# Patient Record
Sex: FEMALE | Race: WHITE | NOT HISPANIC OR LATINO | Employment: OTHER | URBAN - METROPOLITAN AREA
[De-identification: names, ages, dates, MRNs, and addresses within clinical notes are randomized per-mention and may not be internally consistent; named-entity substitution may affect disease eponyms.]

---

## 2017-02-24 ENCOUNTER — HISTORICAL (OUTPATIENT)
Dept: RADIOLOGY | Facility: HOSPITAL | Age: 72
End: 2017-02-24

## 2017-03-10 ENCOUNTER — HISTORICAL (OUTPATIENT)
Dept: ADMINISTRATIVE | Facility: HOSPITAL | Age: 72
End: 2017-03-10

## 2017-04-13 ENCOUNTER — HISTORICAL (OUTPATIENT)
Dept: ADMINISTRATIVE | Facility: HOSPITAL | Age: 72
End: 2017-04-13

## 2017-07-13 ENCOUNTER — HISTORICAL (OUTPATIENT)
Dept: ADMINISTRATIVE | Facility: HOSPITAL | Age: 72
End: 2017-07-13

## 2017-09-13 ENCOUNTER — HISTORICAL (OUTPATIENT)
Dept: ADMINISTRATIVE | Facility: HOSPITAL | Age: 72
End: 2017-09-13

## 2017-09-13 LAB
ABS NEUT (OLG): 2.14 X10(3)/MCL (ref 2.1–9.2)
BASOPHILS # BLD AUTO: 0 X10(3)/MCL (ref 0–0.2)
BASOPHILS NFR BLD AUTO: 1 %
CHOLEST SERPL-MCNC: 186 MG/DL (ref 0–200)
CHOLEST/HDLC SERPL: 2.8 {RATIO} (ref 0–4)
DEPRECATED CALCIDIOL+CALCIFEROL SERPL-MC: 46.39 NG/ML (ref 30–80)
EOSINOPHIL # BLD AUTO: 0.1 X10(3)/MCL (ref 0–0.9)
EOSINOPHIL NFR BLD AUTO: 3 %
ERYTHROCYTE [DISTWIDTH] IN BLOOD BY AUTOMATED COUNT: 14.1 % (ref 11.5–17)
HCT VFR BLD AUTO: 36.9 % (ref 37–47)
HDLC SERPL-MCNC: 67 MG/DL (ref 35–60)
HGB BLD-MCNC: 11.8 GM/DL (ref 12–16)
IRON SATN MFR SERPL: 29.9 % (ref 20–50)
IRON SERPL-MCNC: 91 MCG/DL (ref 50–175)
LDLC SERPL CALC-MCNC: 105 MG/DL (ref 0–129)
LYMPHOCYTES # BLD AUTO: 1.5 X10(3)/MCL (ref 0.6–4.6)
LYMPHOCYTES NFR BLD AUTO: 36 %
MCH RBC QN AUTO: 29 PG (ref 27–31)
MCHC RBC AUTO-ENTMCNC: 32 GM/DL (ref 33–36)
MCV RBC AUTO: 90.7 FL (ref 80–94)
MONOCYTES # BLD AUTO: 0.5 X10(3)/MCL (ref 0.1–1.3)
MONOCYTES NFR BLD AUTO: 12 %
NEUTROPHILS # BLD AUTO: 2.14 X10(3)/MCL (ref 2.1–9.2)
NEUTROPHILS NFR BLD AUTO: 49 %
PLATELET # BLD AUTO: 211 X10(3)/MCL (ref 130–400)
PMV BLD AUTO: 9.4 FL (ref 9.4–12.4)
RBC # BLD AUTO: 4.07 X10(6)/MCL (ref 4.2–5.4)
TIBC SERPL-MCNC: 304 MCG/DL (ref 250–450)
TRANSFERRIN SERPL-MCNC: 234 MG/DL (ref 200–360)
TRIGL SERPL-MCNC: 68 MG/DL (ref 30–150)
VLDLC SERPL CALC-MCNC: 14 MG/DL
WBC # SPEC AUTO: 4.3 X10(3)/MCL (ref 4.5–11.5)

## 2017-09-22 ENCOUNTER — HISTORICAL (OUTPATIENT)
Dept: RADIOLOGY | Facility: HOSPITAL | Age: 72
End: 2017-09-22

## 2017-09-22 LAB — POC CREATININE: 0.8 MG/DL (ref 0.6–1.3)

## 2017-10-06 ENCOUNTER — HISTORICAL (OUTPATIENT)
Dept: RADIOLOGY | Facility: HOSPITAL | Age: 72
End: 2017-10-06

## 2018-03-13 ENCOUNTER — HISTORICAL (OUTPATIENT)
Dept: ADMINISTRATIVE | Facility: HOSPITAL | Age: 73
End: 2018-03-13

## 2018-03-13 LAB
ABS NEUT (OLG): 2.36 X10(3)/MCL (ref 2.1–9.2)
ALBUMIN SERPL-MCNC: 3.5 GM/DL (ref 3.4–5)
ALBUMIN/GLOB SERPL: 1.2 {RATIO}
ALP SERPL-CCNC: 101 UNIT/L (ref 38–126)
ALT SERPL-CCNC: 23 UNIT/L (ref 12–78)
AST SERPL-CCNC: 19 UNIT/L (ref 15–37)
BASOPHILS # BLD AUTO: 0.1 X10(3)/MCL (ref 0–0.2)
BASOPHILS NFR BLD AUTO: 1 %
BILIRUB SERPL-MCNC: 0.6 MG/DL (ref 0.2–1)
BILIRUBIN DIRECT+TOT PNL SERPL-MCNC: 0.2 MG/DL (ref 0–0.2)
BILIRUBIN DIRECT+TOT PNL SERPL-MCNC: 0.4 MG/DL (ref 0–0.8)
BUN SERPL-MCNC: 19 MG/DL (ref 7–18)
CALCIUM SERPL-MCNC: 9.2 MG/DL (ref 8.5–10.1)
CHLORIDE SERPL-SCNC: 103 MMOL/L (ref 98–107)
CHOLEST SERPL-MCNC: 190 MG/DL (ref 0–200)
CHOLEST/HDLC SERPL: 2.9 {RATIO} (ref 0–4)
CO2 SERPL-SCNC: 31 MMOL/L (ref 21–32)
CREAT SERPL-MCNC: 0.65 MG/DL (ref 0.55–1.02)
DEPRECATED CALCIDIOL+CALCIFEROL SERPL-MC: 48.44 NG/ML (ref 30–80)
EOSINOPHIL # BLD AUTO: 0.1 X10(3)/MCL (ref 0–0.9)
EOSINOPHIL NFR BLD AUTO: 3 %
ERYTHROCYTE [DISTWIDTH] IN BLOOD BY AUTOMATED COUNT: 13.7 % (ref 11.5–17)
GLOBULIN SER-MCNC: 3 GM/DL (ref 2.4–3.5)
GLUCOSE SERPL-MCNC: 98 MG/DL (ref 74–106)
HCT VFR BLD AUTO: 37.2 % (ref 37–47)
HDLC SERPL-MCNC: 66 MG/DL (ref 35–60)
HGB BLD-MCNC: 12.1 GM/DL (ref 12–16)
IRON SATN MFR SERPL: 28 % (ref 20–50)
IRON SERPL-MCNC: 93 MCG/DL (ref 50–175)
LDLC SERPL CALC-MCNC: 102 MG/DL (ref 0–129)
LYMPHOCYTES # BLD AUTO: 1.2 X10(3)/MCL (ref 0.6–4.6)
LYMPHOCYTES NFR BLD AUTO: 29 %
MCH RBC QN AUTO: 28.7 PG (ref 27–31)
MCHC RBC AUTO-ENTMCNC: 32.5 GM/DL (ref 33–36)
MCV RBC AUTO: 88.4 FL (ref 80–94)
MONOCYTES # BLD AUTO: 0.5 X10(3)/MCL (ref 0.1–1.3)
MONOCYTES NFR BLD AUTO: 11 %
NEUTROPHILS # BLD AUTO: 2.36 X10(3)/MCL (ref 2.1–9.2)
NEUTROPHILS NFR BLD AUTO: 55 %
PLATELET # BLD AUTO: 215 X10(3)/MCL (ref 130–400)
PMV BLD AUTO: 9.5 FL (ref 9.4–12.4)
POTASSIUM SERPL-SCNC: 4.5 MMOL/L (ref 3.5–5.1)
PROT SERPL-MCNC: 6.5 GM/DL (ref 6.4–8.2)
RBC # BLD AUTO: 4.21 X10(6)/MCL (ref 4.2–5.4)
SODIUM SERPL-SCNC: 139 MMOL/L (ref 136–145)
TIBC SERPL-MCNC: 332 MCG/DL (ref 250–450)
TRANSFERRIN SERPL-MCNC: 262 MG/DL (ref 200–360)
TRIGL SERPL-MCNC: 110 MG/DL (ref 30–150)
TSH SERPL-ACNC: 1.3 MIU/ML (ref 0.36–3.74)
VLDLC SERPL CALC-MCNC: 22 MG/DL
WBC # SPEC AUTO: 4.3 X10(3)/MCL (ref 4.5–11.5)

## 2018-09-14 ENCOUNTER — HISTORICAL (OUTPATIENT)
Dept: RADIOLOGY | Facility: HOSPITAL | Age: 73
End: 2018-09-14

## 2018-09-14 LAB — POC CREATININE: 0.7 MG/DL (ref 0.6–1.3)

## 2019-01-04 ENCOUNTER — HISTORICAL (OUTPATIENT)
Dept: ADMINISTRATIVE | Facility: HOSPITAL | Age: 74
End: 2019-01-04

## 2019-01-04 LAB
BUN SERPL-MCNC: 17 MG/DL (ref 7–18)
CALCIUM SERPL-MCNC: 9.1 MG/DL (ref 8.5–10.1)
CHLORIDE SERPL-SCNC: 101 MMOL/L (ref 98–107)
CHOLEST SERPL-MCNC: 182 MG/DL (ref 0–200)
CHOLEST/HDLC SERPL: 2.8 {RATIO} (ref 0–4)
CO2 SERPL-SCNC: 30 MMOL/L (ref 21–32)
CREAT SERPL-MCNC: 0.76 MG/DL (ref 0.55–1.02)
CREAT/UREA NIT SERPL: 22.4
DEPRECATED CALCIDIOL+CALCIFEROL SERPL-MC: 68.78 NG/ML (ref 30–80)
GLUCOSE SERPL-MCNC: 91 MG/DL (ref 74–106)
HDLC SERPL-MCNC: 66 MG/DL (ref 35–60)
LDLC SERPL CALC-MCNC: 105 MG/DL (ref 0–129)
POTASSIUM SERPL-SCNC: 4.8 MMOL/L (ref 3.5–5.1)
SODIUM SERPL-SCNC: 138 MMOL/L (ref 136–145)
TRIGL SERPL-MCNC: 54 MG/DL (ref 30–150)
VLDLC SERPL CALC-MCNC: 11 MG/DL

## 2019-01-18 ENCOUNTER — HISTORICAL (OUTPATIENT)
Dept: RADIOLOGY | Facility: HOSPITAL | Age: 74
End: 2019-01-18

## 2019-03-19 ENCOUNTER — HISTORICAL (OUTPATIENT)
Dept: LAB | Facility: HOSPITAL | Age: 74
End: 2019-03-19

## 2019-07-12 ENCOUNTER — HISTORICAL (OUTPATIENT)
Dept: ADMINISTRATIVE | Facility: HOSPITAL | Age: 74
End: 2019-07-12

## 2019-07-12 LAB
ABS NEUT (OLG): 2.06 X10(3)/MCL (ref 2.1–9.2)
ALBUMIN SERPL-MCNC: 3.7 GM/DL (ref 3.4–5)
ALBUMIN/GLOB SERPL: 1.3 RATIO (ref 1.1–2)
ALP SERPL-CCNC: 83 UNIT/L (ref 38–126)
ALT SERPL-CCNC: 21 UNIT/L (ref 12–78)
APPEARANCE, UA: CLEAR
AST SERPL-CCNC: 17 UNIT/L (ref 15–37)
BACTERIA SPEC CULT: ABNORMAL /HPF
BASOPHILS # BLD AUTO: 0 X10(3)/MCL (ref 0–0.2)
BASOPHILS NFR BLD AUTO: 1 %
BILIRUB SERPL-MCNC: 0.7 MG/DL (ref 0.2–1)
BILIRUB UR QL STRIP: NEGATIVE
BILIRUBIN DIRECT+TOT PNL SERPL-MCNC: 0.2 MG/DL (ref 0–0.5)
BILIRUBIN DIRECT+TOT PNL SERPL-MCNC: 0.5 MG/DL (ref 0–0.8)
BUN SERPL-MCNC: 19 MG/DL (ref 7–18)
CALCIUM SERPL-MCNC: 9.1 MG/DL (ref 8.5–10.1)
CHLORIDE SERPL-SCNC: 103 MMOL/L (ref 98–107)
CHOLEST SERPL-MCNC: 177 MG/DL (ref 0–200)
CHOLEST/HDLC SERPL: 2.7 {RATIO} (ref 0–4)
CO2 SERPL-SCNC: 32 MMOL/L (ref 21–32)
COLOR UR: YELLOW
CREAT SERPL-MCNC: 0.69 MG/DL (ref 0.55–1.02)
DEPRECATED CALCIDIOL+CALCIFEROL SERPL-MC: 54.96 NG/ML (ref 30–80)
EOSINOPHIL # BLD AUTO: 0.1 X10(3)/MCL (ref 0–0.9)
EOSINOPHIL NFR BLD AUTO: 3 %
ERYTHROCYTE [DISTWIDTH] IN BLOOD BY AUTOMATED COUNT: 13.6 % (ref 11.5–17)
GLOBULIN SER-MCNC: 2.9 GM/DL (ref 2.4–3.5)
GLUCOSE (UA): NEGATIVE
GLUCOSE SERPL-MCNC: 89 MG/DL (ref 74–106)
HCT VFR BLD AUTO: 39.3 % (ref 37–47)
HDLC SERPL-MCNC: 65 MG/DL (ref 35–60)
HGB BLD-MCNC: 12.3 GM/DL (ref 12–16)
HGB UR QL STRIP: ABNORMAL
KETONES UR QL STRIP: NEGATIVE
LDLC SERPL CALC-MCNC: 101 MG/DL (ref 0–129)
LEUKOCYTE ESTERASE UR QL STRIP: ABNORMAL
LYMPHOCYTES # BLD AUTO: 1.4 X10(3)/MCL (ref 0.6–4.6)
LYMPHOCYTES NFR BLD AUTO: 33 %
MCH RBC QN AUTO: 28.7 PG (ref 27–31)
MCHC RBC AUTO-ENTMCNC: 31.3 GM/DL (ref 33–36)
MCV RBC AUTO: 91.6 FL (ref 80–94)
MONOCYTES # BLD AUTO: 0.6 X10(3)/MCL (ref 0.1–1.3)
MONOCYTES NFR BLD AUTO: 13 %
NEUTROPHILS # BLD AUTO: 2.06 X10(3)/MCL (ref 2.1–9.2)
NEUTROPHILS NFR BLD AUTO: 49 %
NITRITE UR QL STRIP: NEGATIVE
PH UR STRIP: 8 [PH] (ref 5–9)
PLATELET # BLD AUTO: 222 X10(3)/MCL (ref 130–400)
PMV BLD AUTO: 10.3 FL (ref 9.4–12.4)
POTASSIUM SERPL-SCNC: 4.4 MMOL/L (ref 3.5–5.1)
PROT SERPL-MCNC: 6.6 GM/DL (ref 6.4–8.2)
PROT UR QL STRIP: NEGATIVE
RBC # BLD AUTO: 4.29 X10(6)/MCL (ref 4.2–5.4)
RBC #/AREA URNS HPF: 5 /HPF (ref 0–2)
SODIUM SERPL-SCNC: 140 MMOL/L (ref 136–145)
SP GR UR STRIP: 1.01 (ref 1–1.03)
SQUAMOUS EPITHELIAL, UA: ABNORMAL
TRIGL SERPL-MCNC: 55 MG/DL (ref 30–150)
TSH SERPL-ACNC: 1.07 MIU/L (ref 0.36–3.74)
UROBILINOGEN UR STRIP-ACNC: 0.2
VLDLC SERPL CALC-MCNC: 11 MG/DL
WBC # SPEC AUTO: 4.2 X10(3)/MCL (ref 4.5–11.5)
WBC #/AREA URNS HPF: ABNORMAL /[HPF]

## 2019-07-18 ENCOUNTER — HISTORICAL (OUTPATIENT)
Dept: ADMINISTRATIVE | Facility: HOSPITAL | Age: 74
End: 2019-07-18

## 2019-07-18 LAB
APPEARANCE, UA: CLEAR
BACTERIA SPEC CULT: ABNORMAL /HPF
BILIRUB UR QL STRIP: NEGATIVE
COLOR UR: YELLOW
GLUCOSE (UA): NEGATIVE
HGB UR QL STRIP: ABNORMAL
KETONES UR QL STRIP: NEGATIVE
LEUKOCYTE ESTERASE UR QL STRIP: ABNORMAL
NITRITE UR QL STRIP: NEGATIVE
PH UR STRIP: 6.5 [PH] (ref 5–9)
PROT UR QL STRIP: NEGATIVE
RBC #/AREA URNS HPF: 5 /HPF (ref 0–2)
SP GR UR STRIP: 1.01 (ref 1–1.03)
SQUAMOUS EPITHELIAL, UA: ABNORMAL
UROBILINOGEN UR STRIP-ACNC: 0.2
WBC #/AREA URNS HPF: ABNORMAL /[HPF]

## 2019-09-27 ENCOUNTER — HISTORICAL (OUTPATIENT)
Dept: RADIOLOGY | Facility: HOSPITAL | Age: 74
End: 2019-09-27

## 2019-09-27 LAB — POC CREATININE: 0.6 MG/DL (ref 0.6–1.3)

## 2019-11-15 ENCOUNTER — HISTORICAL (OUTPATIENT)
Dept: RADIOLOGY | Facility: HOSPITAL | Age: 74
End: 2019-11-15

## 2019-11-15 LAB
BUN SERPL-MCNC: 15 MG/DL (ref 7–18)
CALCIUM SERPL-MCNC: 9.1 MG/DL (ref 8.5–10.1)
CHLORIDE SERPL-SCNC: 103 MMOL/L (ref 98–107)
CO2 SERPL-SCNC: 30 MMOL/L (ref 21–32)
CREAT SERPL-MCNC: 0.69 MG/DL (ref 0.55–1.02)
CREAT/UREA NIT SERPL: 21.7
GLUCOSE SERPL-MCNC: 99 MG/DL (ref 74–106)
POC CREATININE: 0.8 MG/DL (ref 0.6–1.3)
POTASSIUM SERPL-SCNC: 4.9 MMOL/L (ref 3.5–5.1)
SODIUM SERPL-SCNC: 137 MMOL/L (ref 136–145)

## 2020-01-24 ENCOUNTER — HISTORICAL (OUTPATIENT)
Dept: RADIOLOGY | Facility: HOSPITAL | Age: 75
End: 2020-01-24

## 2020-03-06 ENCOUNTER — HISTORICAL (OUTPATIENT)
Dept: ADMINISTRATIVE | Facility: HOSPITAL | Age: 75
End: 2020-03-06

## 2020-03-06 LAB
BUN SERPL-MCNC: 18 MG/DL (ref 7–18)
CALCIUM SERPL-MCNC: 9.1 MG/DL (ref 8.5–10.1)
CHLORIDE SERPL-SCNC: 106 MMOL/L (ref 98–107)
CHOLEST SERPL-MCNC: 163 MG/DL (ref 0–200)
CHOLEST/HDLC SERPL: 2.3 {RATIO} (ref 0–4)
CO2 SERPL-SCNC: 27 MMOL/L (ref 21–32)
CREAT SERPL-MCNC: 0.62 MG/DL (ref 0.55–1.02)
CREAT/UREA NIT SERPL: 29
GLUCOSE SERPL-MCNC: 99 MG/DL (ref 74–106)
HDLC SERPL-MCNC: 72 MG/DL (ref 35–60)
LDLC SERPL CALC-MCNC: 80 MG/DL (ref 0–129)
POTASSIUM SERPL-SCNC: 4.5 MMOL/L (ref 3.5–5.1)
SODIUM SERPL-SCNC: 138 MMOL/L (ref 136–145)
TRIGL SERPL-MCNC: 54 MG/DL (ref 30–150)
VLDLC SERPL CALC-MCNC: 11 MG/DL

## 2020-09-04 ENCOUNTER — HISTORICAL (OUTPATIENT)
Dept: ADMINISTRATIVE | Facility: HOSPITAL | Age: 75
End: 2020-09-04

## 2020-09-04 LAB
ABS NEUT (OLG): 2.5 X10(3)/MCL (ref 2.1–9.2)
ALBUMIN SERPL-MCNC: 3.9 GM/DL (ref 3.4–5)
ALBUMIN/GLOB SERPL: 1.6 RATIO (ref 1.1–2)
ALP SERPL-CCNC: 82 UNIT/L (ref 40–150)
ALT SERPL-CCNC: 18 UNIT/L (ref 0–55)
APPEARANCE, UA: CLEAR
AST SERPL-CCNC: 20 UNIT/L (ref 5–34)
BACTERIA SPEC CULT: NORMAL /HPF
BASOPHILS # BLD AUTO: 0 X10(3)/MCL (ref 0–0.2)
BASOPHILS NFR BLD AUTO: 1 %
BILIRUB SERPL-MCNC: 0.8 MG/DL
BILIRUB UR QL STRIP: NEGATIVE
BILIRUBIN DIRECT+TOT PNL SERPL-MCNC: 0.3 MG/DL (ref 0–0.5)
BILIRUBIN DIRECT+TOT PNL SERPL-MCNC: 0.5 MG/DL (ref 0–0.8)
BUN SERPL-MCNC: 11.9 MG/DL (ref 9.8–20.1)
CALCIUM SERPL-MCNC: 8.9 MG/DL (ref 8.4–10.2)
CHLORIDE SERPL-SCNC: 102 MMOL/L (ref 98–107)
CHOLEST SERPL-MCNC: 157 MG/DL
CHOLEST/HDLC SERPL: 3 {RATIO} (ref 0–5)
CO2 SERPL-SCNC: 31 MMOL/L (ref 23–31)
COLOR UR: YELLOW
CREAT SERPL-MCNC: 0.74 MG/DL (ref 0.55–1.02)
DEPRECATED CALCIDIOL+CALCIFEROL SERPL-MC: 30.6 NG/ML (ref 6.6–49.9)
EOSINOPHIL # BLD AUTO: 0.1 X10(3)/MCL (ref 0–0.9)
EOSINOPHIL NFR BLD AUTO: 2 %
ERYTHROCYTE [DISTWIDTH] IN BLOOD BY AUTOMATED COUNT: 13.6 % (ref 11.5–17)
GLOBULIN SER-MCNC: 2.5 GM/DL (ref 2.4–3.5)
GLUCOSE (UA): NEGATIVE
GLUCOSE SERPL-MCNC: 91 MG/DL (ref 82–115)
HCT VFR BLD AUTO: 40 % (ref 37–47)
HDLC SERPL-MCNC: 58 MG/DL (ref 35–60)
HGB BLD-MCNC: 12.7 GM/DL (ref 12–16)
HGB UR QL STRIP: NEGATIVE
KETONES UR QL STRIP: NEGATIVE
LDLC SERPL CALC-MCNC: 89 MG/DL (ref 50–140)
LEUKOCYTE ESTERASE UR QL STRIP: NEGATIVE
LYMPHOCYTES # BLD AUTO: 1.3 X10(3)/MCL (ref 0.6–4.6)
LYMPHOCYTES NFR BLD AUTO: 29 %
MCH RBC QN AUTO: 29.1 PG (ref 27–31)
MCHC RBC AUTO-ENTMCNC: 31.8 GM/DL (ref 33–36)
MCV RBC AUTO: 91.5 FL (ref 80–94)
MONOCYTES # BLD AUTO: 0.5 X10(3)/MCL (ref 0.1–1.3)
MONOCYTES NFR BLD AUTO: 12 %
NEUTROPHILS # BLD AUTO: 2.5 X10(3)/MCL (ref 2.1–9.2)
NEUTROPHILS NFR BLD AUTO: 56 %
NITRITE UR QL STRIP: NEGATIVE
PH UR STRIP: 8.5 [PH] (ref 5–9)
PLATELET # BLD AUTO: 230 X10(3)/MCL (ref 130–400)
PMV BLD AUTO: 10.2 FL (ref 9.4–12.4)
POTASSIUM SERPL-SCNC: 4.5 MMOL/L (ref 3.5–5.1)
PROT SERPL-MCNC: 6.4 GM/DL (ref 5.8–7.6)
PROT UR QL STRIP: NEGATIVE
RBC # BLD AUTO: 4.37 X10(6)/MCL (ref 4.2–5.4)
RBC #/AREA URNS HPF: NORMAL /[HPF]
SODIUM SERPL-SCNC: 140 MMOL/L (ref 136–145)
SP GR UR STRIP: 1.01 (ref 1–1.03)
SQUAMOUS EPITHELIAL, UA: NORMAL
TRIGL SERPL-MCNC: 51 MG/DL (ref 37–140)
TSH SERPL-ACNC: 0.72 UIU/ML (ref 0.35–4.94)
UROBILINOGEN UR STRIP-ACNC: 0.2
VLDLC SERPL CALC-MCNC: 10 MG/DL
WBC # SPEC AUTO: 4.5 X10(3)/MCL (ref 4.5–11.5)
WBC #/AREA URNS HPF: NORMAL /[HPF]

## 2021-01-22 ENCOUNTER — HISTORICAL (OUTPATIENT)
Dept: RADIOLOGY | Facility: HOSPITAL | Age: 76
End: 2021-01-22

## 2021-03-22 ENCOUNTER — HISTORICAL (OUTPATIENT)
Dept: ADMINISTRATIVE | Facility: HOSPITAL | Age: 76
End: 2021-03-22

## 2021-03-22 LAB
ABS NEUT (OLG): 3.08 X10(3)/MCL (ref 2.1–9.2)
ALBUMIN SERPL-MCNC: 4 GM/DL (ref 3.4–4.8)
ALBUMIN/GLOB SERPL: 1.6 RATIO (ref 1.1–2)
ALP SERPL-CCNC: 87 UNIT/L (ref 40–150)
ALT SERPL-CCNC: 17 UNIT/L (ref 0–55)
APPEARANCE, UA: CLEAR
AST SERPL-CCNC: 19 UNIT/L (ref 5–34)
BACTERIA SPEC CULT: ABNORMAL /HPF
BASOPHILS # BLD AUTO: 0 X10(3)/MCL (ref 0–0.2)
BASOPHILS NFR BLD AUTO: 1 %
BILIRUB SERPL-MCNC: 0.7 MG/DL
BILIRUB UR QL STRIP: NEGATIVE
BILIRUBIN DIRECT+TOT PNL SERPL-MCNC: 0.3 MG/DL (ref 0–0.5)
BILIRUBIN DIRECT+TOT PNL SERPL-MCNC: 0.4 MG/DL (ref 0–0.8)
BUN SERPL-MCNC: 16.8 MG/DL (ref 9.8–20.1)
CALCIUM SERPL-MCNC: 8.9 MG/DL (ref 8.4–10.2)
CHLORIDE SERPL-SCNC: 100 MMOL/L (ref 98–107)
CHOLEST SERPL-MCNC: 164 MG/DL
CHOLEST/HDLC SERPL: 3 {RATIO} (ref 0–5)
CO2 SERPL-SCNC: 32 MMOL/L (ref 23–31)
COLOR UR: YELLOW
CREAT SERPL-MCNC: 0.69 MG/DL (ref 0.55–1.02)
EOSINOPHIL # BLD AUTO: 0.1 X10(3)/MCL (ref 0–0.9)
EOSINOPHIL NFR BLD AUTO: 2 %
ERYTHROCYTE [DISTWIDTH] IN BLOOD BY AUTOMATED COUNT: 14 % (ref 11.5–17)
GLOBULIN SER-MCNC: 2.5 GM/DL (ref 2.4–3.5)
GLUCOSE (UA): NEGATIVE
GLUCOSE SERPL-MCNC: 102 MG/DL (ref 82–115)
HCT VFR BLD AUTO: 39.2 % (ref 37–47)
HDLC SERPL-MCNC: 60 MG/DL (ref 35–60)
HGB BLD-MCNC: 12.6 GM/DL (ref 12–16)
HGB UR QL STRIP: ABNORMAL
KETONES UR QL STRIP: ABNORMAL
LDLC SERPL CALC-MCNC: 95 MG/DL (ref 50–140)
LEUKOCYTE ESTERASE UR QL STRIP: ABNORMAL
LYMPHOCYTES # BLD AUTO: 1.3 X10(3)/MCL (ref 0.6–4.6)
LYMPHOCYTES NFR BLD AUTO: 25 %
MCH RBC QN AUTO: 28.6 PG (ref 27–31)
MCHC RBC AUTO-ENTMCNC: 32.1 GM/DL (ref 33–36)
MCV RBC AUTO: 89.1 FL (ref 80–94)
MONOCYTES # BLD AUTO: 0.6 X10(3)/MCL (ref 0.1–1.3)
MONOCYTES NFR BLD AUTO: 12 %
NEUTROPHILS # BLD AUTO: 3.08 X10(3)/MCL (ref 2.1–9.2)
NEUTROPHILS NFR BLD AUTO: 59 %
NITRITE UR QL STRIP: NEGATIVE
PH UR STRIP: 7 [PH] (ref 5–9)
PLATELET # BLD AUTO: 225 X10(3)/MCL (ref 130–400)
PMV BLD AUTO: 10.5 FL (ref 9.4–12.4)
POTASSIUM SERPL-SCNC: 4.3 MMOL/L (ref 3.5–5.1)
PROT SERPL-MCNC: 6.5 GM/DL (ref 5.8–7.6)
PROT UR QL STRIP: ABNORMAL
RBC # BLD AUTO: 4.4 X10(6)/MCL (ref 4.2–5.4)
RBC #/AREA URNS HPF: 10 /HPF (ref 0–2)
SODIUM SERPL-SCNC: 137 MMOL/L (ref 136–145)
SP GR UR STRIP: 1.02 (ref 1–1.03)
SQUAMOUS EPITHELIAL, UA: ABNORMAL
TRIGL SERPL-MCNC: 43 MG/DL (ref 37–140)
UROBILINOGEN UR STRIP-ACNC: 0.2
VLDLC SERPL CALC-MCNC: 9 MG/DL
WBC # SPEC AUTO: 5.2 X10(3)/MCL (ref 4.5–11.5)
WBC #/AREA URNS HPF: ABNORMAL /[HPF]

## 2021-04-07 ENCOUNTER — HISTORICAL (OUTPATIENT)
Dept: ADMINISTRATIVE | Facility: HOSPITAL | Age: 76
End: 2021-04-07

## 2021-04-07 LAB
APPEARANCE, UA: CLEAR
BACTERIA SPEC CULT: NORMAL /HPF
BILIRUB UR QL STRIP: NEGATIVE
COLOR UR: YELLOW
GLUCOSE (UA): NEGATIVE
HGB UR QL STRIP: NEGATIVE
KETONES UR QL STRIP: NEGATIVE
LEUKOCYTE ESTERASE UR QL STRIP: NEGATIVE
NITRITE UR QL STRIP: NEGATIVE
PH UR STRIP: 7 [PH] (ref 5–9)
PROT UR QL STRIP: NEGATIVE
RBC #/AREA URNS HPF: NORMAL /[HPF]
SP GR UR STRIP: 1.01 (ref 1–1.03)
SQUAMOUS EPITHELIAL, UA: NORMAL /HPF (ref 0–4)
UROBILINOGEN UR STRIP-ACNC: 0.2
WBC #/AREA URNS HPF: NORMAL /[HPF]

## 2021-04-16 ENCOUNTER — HISTORICAL (OUTPATIENT)
Dept: SURGERY | Facility: HOSPITAL | Age: 76
End: 2021-04-16

## 2021-05-07 ENCOUNTER — HISTORICAL (OUTPATIENT)
Dept: RADIOLOGY | Facility: HOSPITAL | Age: 76
End: 2021-05-07

## 2021-05-07 LAB
T3FREE SERPL-MCNC: 2.5 PG/ML (ref 1.58–3.91)
T4 FREE SERPL-MCNC: 0.98 NG/DL (ref 0.7–1.48)
TSH SERPL-ACNC: 0.97 UIU/ML (ref 0.35–4.94)

## 2021-06-11 ENCOUNTER — HISTORICAL (OUTPATIENT)
Dept: ADMINISTRATIVE | Facility: HOSPITAL | Age: 76
End: 2021-06-11

## 2021-08-25 ENCOUNTER — HISTORICAL (OUTPATIENT)
Dept: ADMINISTRATIVE | Facility: HOSPITAL | Age: 76
End: 2021-08-25

## 2021-10-08 ENCOUNTER — HISTORICAL (OUTPATIENT)
Dept: ADMINISTRATIVE | Facility: HOSPITAL | Age: 76
End: 2021-10-08

## 2021-10-08 LAB
ABS NEUT (OLG): 2.39 X10(3)/MCL (ref 2.1–9.2)
BASOPHILS # BLD AUTO: 0 X10(3)/MCL (ref 0–0.2)
BASOPHILS NFR BLD AUTO: 1 %
BUN SERPL-MCNC: 15.9 MG/DL (ref 9.8–20.1)
CALCIUM SERPL-MCNC: 9.3 MG/DL (ref 8.4–10.2)
CHLORIDE SERPL-SCNC: 99 MMOL/L (ref 98–107)
CHOLEST SERPL-MCNC: 158 MG/DL
CHOLEST/HDLC SERPL: 3 {RATIO} (ref 0–5)
CO2 SERPL-SCNC: 26 MMOL/L (ref 23–31)
CREAT SERPL-MCNC: 0.66 MG/DL (ref 0.55–1.02)
CREAT/UREA NIT SERPL: 24
EOSINOPHIL # BLD AUTO: 0.1 X10(3)/MCL (ref 0–0.9)
EOSINOPHIL NFR BLD AUTO: 3 %
ERYTHROCYTE [DISTWIDTH] IN BLOOD BY AUTOMATED COUNT: 13.9 % (ref 11.5–17)
GLUCOSE SERPL-MCNC: 90 MG/DL (ref 82–115)
HCT VFR BLD AUTO: 36.2 % (ref 37–47)
HDLC SERPL-MCNC: 52 MG/DL (ref 35–60)
HGB BLD-MCNC: 11.6 GM/DL (ref 12–16)
LDLC SERPL CALC-MCNC: 96 MG/DL (ref 50–140)
LYMPHOCYTES # BLD AUTO: 1.2 X10(3)/MCL (ref 0.6–4.6)
LYMPHOCYTES NFR BLD AUTO: 28 %
MCH RBC QN AUTO: 29 PG (ref 27–31)
MCHC RBC AUTO-ENTMCNC: 32 GM/DL (ref 33–36)
MCV RBC AUTO: 90.5 FL (ref 80–94)
MONOCYTES # BLD AUTO: 0.6 X10(3)/MCL (ref 0.1–1.3)
MONOCYTES NFR BLD AUTO: 14 %
NEUTROPHILS # BLD AUTO: 2.39 X10(3)/MCL (ref 2.1–9.2)
NEUTROPHILS NFR BLD AUTO: 54 %
PLATELET # BLD AUTO: 306 X10(3)/MCL (ref 130–400)
PMV BLD AUTO: 9.8 FL (ref 9.4–12.4)
POTASSIUM SERPL-SCNC: 4.1 MMOL/L (ref 3.5–5.1)
RBC # BLD AUTO: 4 X10(6)/MCL (ref 4.2–5.4)
SODIUM SERPL-SCNC: 135 MMOL/L (ref 136–145)
TRIGL SERPL-MCNC: 48 MG/DL (ref 37–140)
VLDLC SERPL CALC-MCNC: 10 MG/DL
WBC # SPEC AUTO: 4.4 X10(3)/MCL (ref 4.5–11.5)

## 2021-10-18 ENCOUNTER — HISTORICAL (OUTPATIENT)
Dept: ADMINISTRATIVE | Facility: HOSPITAL | Age: 76
End: 2021-10-18

## 2021-10-18 LAB
ABS NEUT (OLG): 2.84 X10(3)/MCL (ref 2.1–9.2)
BASOPHILS # BLD AUTO: 0 X10(3)/MCL (ref 0–0.2)
BASOPHILS NFR BLD AUTO: 1 %
DEPRECATED CALCIDIOL+CALCIFEROL SERPL-MC: 31.1 NG/ML (ref 30–80)
EOSINOPHIL # BLD AUTO: 0.1 X10(3)/MCL (ref 0–0.9)
EOSINOPHIL NFR BLD AUTO: 2 %
ERYTHROCYTE [DISTWIDTH] IN BLOOD BY AUTOMATED COUNT: 13.9 % (ref 11.5–17)
FERRITIN SERPL-MCNC: 46.02 NG/ML (ref 4.63–204)
FOLATE SERPL-MCNC: 10.9 NG/ML (ref 7–31.4)
HCT VFR BLD AUTO: 36.2 % (ref 37–47)
HGB BLD-MCNC: 11.7 GM/DL (ref 12–16)
IRON SATN MFR SERPL: 20 % (ref 20–50)
IRON SERPL-MCNC: 54 UG/DL (ref 50–170)
LYMPHOCYTES # BLD AUTO: 1.6 X10(3)/MCL (ref 0.6–4.6)
LYMPHOCYTES NFR BLD AUTO: 31 %
MCH RBC QN AUTO: 28.5 PG (ref 27–31)
MCHC RBC AUTO-ENTMCNC: 32.3 GM/DL (ref 33–36)
MCV RBC AUTO: 88.1 FL (ref 80–94)
MONOCYTES # BLD AUTO: 0.6 X10(3)/MCL (ref 0.1–1.3)
MONOCYTES NFR BLD AUTO: 12 %
NEUTROPHILS # BLD AUTO: 2.84 X10(3)/MCL (ref 2.1–9.2)
NEUTROPHILS NFR BLD AUTO: 54 %
PLATELET # BLD AUTO: 273 X10(3)/MCL (ref 130–400)
PMV BLD AUTO: 9.8 FL (ref 9.4–12.4)
RBC # BLD AUTO: 4.11 X10(6)/MCL (ref 4.2–5.4)
RET# (OHS): 0.05 X10^6/ML (ref 0.02–0.08)
RETICULOCYTE COUNT AUTOMATED (OLG): 1.2 % (ref 1.1–2.1)
TIBC SERPL-MCNC: 214 UG/DL (ref 70–310)
TIBC SERPL-MCNC: 268 UG/DL (ref 250–450)
TRANSFERRIN SERPL-MCNC: 258 MG/DL (ref 173–360)
VIT B12 SERPL-MCNC: 456 PG/ML (ref 213–816)
WBC # SPEC AUTO: 5.3 X10(3)/MCL (ref 4.5–11.5)

## 2021-12-13 ENCOUNTER — HISTORICAL (OUTPATIENT)
Dept: ADMINISTRATIVE | Facility: HOSPITAL | Age: 76
End: 2021-12-13

## 2021-12-13 LAB
ABS NEUT (OLG): 3.28 X10(3)/MCL (ref 2.1–9.2)
BASOPHILS # BLD AUTO: 0 X10(3)/MCL (ref 0–0.2)
BASOPHILS NFR BLD AUTO: 1 %
EOSINOPHIL # BLD AUTO: 0.1 X10(3)/MCL (ref 0–0.9)
EOSINOPHIL NFR BLD AUTO: 1 %
ERYTHROCYTE [DISTWIDTH] IN BLOOD BY AUTOMATED COUNT: 14 % (ref 11.5–17)
FERRITIN SERPL-MCNC: 40.99 NG/ML (ref 4.63–204)
HCT VFR BLD AUTO: 37.2 % (ref 37–47)
HGB BLD-MCNC: 12.2 GM/DL (ref 12–16)
IRON SATN MFR SERPL: 13 % (ref 20–50)
IRON SERPL-MCNC: 37 UG/DL (ref 50–170)
LYMPHOCYTES # BLD AUTO: 1.7 X10(3)/MCL (ref 0.6–4.6)
LYMPHOCYTES NFR BLD AUTO: 29 %
MCH RBC QN AUTO: 28.4 PG (ref 27–31)
MCHC RBC AUTO-ENTMCNC: 32.8 GM/DL (ref 33–36)
MCV RBC AUTO: 86.5 FL (ref 80–94)
MONOCYTES # BLD AUTO: 0.6 X10(3)/MCL (ref 0.1–1.3)
MONOCYTES NFR BLD AUTO: 11 %
NEUTROPHILS # BLD AUTO: 3.28 X10(3)/MCL (ref 2.1–9.2)
NEUTROPHILS NFR BLD AUTO: 57 %
PLATELET # BLD AUTO: 263 X10(3)/MCL (ref 130–400)
PMV BLD AUTO: 10.3 FL (ref 9.4–12.4)
RBC # BLD AUTO: 4.3 X10(6)/MCL (ref 4.2–5.4)
TIBC SERPL-MCNC: 253 UG/DL (ref 70–310)
TIBC SERPL-MCNC: 290 UG/DL (ref 250–450)
TRANSFERRIN SERPL-MCNC: 276 MG/DL (ref 173–360)
WBC # SPEC AUTO: 5.7 X10(3)/MCL (ref 4.5–11.5)

## 2022-04-05 ENCOUNTER — HISTORICAL (OUTPATIENT)
Dept: ADMINISTRATIVE | Facility: HOSPITAL | Age: 77
End: 2022-04-05

## 2022-04-05 LAB
APPEARANCE, UA: NORMAL
BACTERIA SPEC CULT: NORMAL
BILIRUB UR QL STRIP: NEGATIVE
BUDDING YEAST: NORMAL
CASTS, UA: NORMAL
CHOLEST SERPL-MCNC: 161 MG/DL
CHOLEST/HDLC SERPL: 3 {RATIO} (ref 0–5)
COLOR UR: YELLOW
CRYSTALS: NORMAL
GLUCOSE (UA): NEGATIVE
HDLC SERPL-MCNC: 53 MG/DL (ref 35–60)
HGB UR QL STRIP: NEGATIVE
KETONES UR QL STRIP: NEGATIVE
LDLC SERPL CALC-MCNC: 100 MG/DL (ref 50–140)
LEUKOCYTE ESTERASE UR QL STRIP: NEGATIVE
NITRITE UR QL STRIP: NEGATIVE
PH UR STRIP: >=9 [PH] (ref 5–9)
PROT UR QL STRIP: NEGATIVE
RBC #/AREA URNS HPF: NORMAL /[HPF] (ref 0–2)
SMALL ROUND CELLS, UA: NORMAL
SP GR UR STRIP: 1.02 (ref 1–1.03)
SPERM URNS QL MICRO: NORMAL
SQUAMOUS EPITHELIAL, UA: NORMAL (ref 0–4)
TRIGL SERPL-MCNC: 42 MG/DL (ref 37–140)
UROBILINOGEN UR STRIP-ACNC: 0.2
VLDLC SERPL CALC-MCNC: 8 MG/DL
WBC #/AREA URNS HPF: NORMAL /[HPF] (ref 0–2)

## 2022-04-11 ENCOUNTER — HISTORICAL (OUTPATIENT)
Dept: ADMINISTRATIVE | Facility: HOSPITAL | Age: 77
End: 2022-04-11

## 2022-04-11 LAB
BUN SERPL-MCNC: 9.3 MG/DL (ref 9.8–20.1)
CALCIUM SERPL-MCNC: 9.3 MG/DL (ref 8.7–10.5)
CHLORIDE SERPL-SCNC: 88 MMOL/L (ref 98–107)
CO2 SERPL-SCNC: 27 MMOL/L (ref 23–31)
CREAT SERPL-MCNC: 0.63 MG/DL (ref 0.55–1.02)
CREAT/UREA NIT SERPL: 15
GLUCOSE SERPL-MCNC: 79 MG/DL (ref 82–115)
HEMOLYSIS INTERF INDEX SERPL-ACNC: 12
ICTERIC INTERF INDEX SERPL-ACNC: 1
LIPEMIC INTERF INDEX SERPL-ACNC: <0
POTASSIUM SERPL-SCNC: 4.2 MMOL/L (ref 3.5–5.1)
SODIUM SERPL-SCNC: 125 MMOL/L (ref 136–145)

## 2022-04-27 VITALS
SYSTOLIC BLOOD PRESSURE: 156 MMHG | WEIGHT: 229.25 LBS | DIASTOLIC BLOOD PRESSURE: 68 MMHG | HEIGHT: 60 IN | BODY MASS INDEX: 45.01 KG/M2 | OXYGEN SATURATION: 97 %

## 2022-05-05 NOTE — HISTORICAL [facility] CERNER
This is a historical note converted from Eileen. Formatting and pictures may have been removed.  Please reference Eileen for original formatting and attached multimedia. Preoperative Diagnosis  Subungual?osteophyte, distal phalanx, right great toe  Nonhealing?traumatic wound at right great toe  Postoperative Diagnosis  Same plus?ganglionic cyst, right great toe  Operation  Partial excision of distal phalanx, right great toe  Bone biopsy, open, distal phalanx, right  Surgeon(s)  Antonio Dawkins D.P.M.  Anesthesia  IV sedation plus right ankle block  Estimated Blood Loss  Minimal  Urine Output  None  Findings  Clear?gelatinous?cystic?material extending from?distal phalanx, right great toe.??Osteophyte.  Specimen(s)  Bone, distal phalanx right great toe  Complications  None  Technique  Patient brought into the operating room placed in the operating table in the supine position.??Following induction of?IV sedation?right?ankle block was performed using a one-to-one mixture of 0.5% Marcaine plain and 1% lidocaine plain.??The right lower extremity was prepped and draped in the usual sterile fashion.  ?  First procedure?was?partial excision distal phalanx right great toe.??A transverse incision was performed?at the?distal aspect of the right great toe.??Dissection was carried down through subcutaneous tissue?to bone?and the dorsal flap was elevated?over the dorsal and distal aspect of the?great toe.??Osteophyte was identified?dorsally?adjacent to?the dorsal traumatic?wound at the nail bed.??Using?rongeur?and saw?the distal and dorsal?aspect of the?phalanx was?removed.??No signs of infection.??There was some cystic drainage?at the more?dorsal distal?aspect of the?exostosis.??Congruently?there was an area of?soft tissue?damage tracking from the nail bed to the bone at the area of?osteophyte.??There was some necrosis of the?soft tissue but no signs of infection.??The?necrotic area of nailbed and subcutaneous tissue was  excised.??The site was thoroughly irrigated?and?bone wax was?placed?over the?area removed?bone?to prevent bleeding and recurrence.??Hemostasis was achieved with electrocautery.  ?  Next procedure was?open bone biopsy, right?foot.??Through the distal approach?incision was performed?in length and?over the distal phalanx of the right great toe.??Dissection was carried down through subcutaneous tissues retracting?and protecting neurovascular structures.??A section of distal phalanx?was removed?with rongeur and sent?as a permanent specimen to rule out?infection?and malignancy.??Hemostasis was achieved with electrocautery.  ?  The incision sites were irrigated with saline?and closed with 4-0 nylon.??Incision sites nailbed was covered with Xeroform followed by?compression dressing.??Patient tolerated procedure and anesthesia well vital signs stable throughout.??She be discharged home with all postoperative instructions and prescriptions will follow up with me?in approximately?4 days.

## 2022-05-26 ENCOUNTER — TELEPHONE (OUTPATIENT)
Dept: INTERNAL MEDICINE | Facility: CLINIC | Age: 77
End: 2022-05-26

## 2022-05-26 ENCOUNTER — LAB VISIT (OUTPATIENT)
Dept: LAB | Facility: HOSPITAL | Age: 77
End: 2022-05-26
Attending: INTERNAL MEDICINE
Payer: MEDICARE

## 2022-05-26 DIAGNOSIS — N30.00 ACUTE CYSTITIS WITHOUT HEMATURIA: ICD-10-CM

## 2022-05-26 DIAGNOSIS — N39.0 URINARY TRACT INFECTION WITHOUT HEMATURIA, SITE UNSPECIFIED: Primary | ICD-10-CM

## 2022-05-26 DIAGNOSIS — N30.00 ACUTE CYSTITIS WITHOUT HEMATURIA: Primary | ICD-10-CM

## 2022-05-26 LAB
APPEARANCE UR: CLEAR
BACTERIA #/AREA URNS AUTO: ABNORMAL /HPF
BILIRUB UR QL STRIP.AUTO: NEGATIVE MG/DL
COLOR UR AUTO: YELLOW
GLUCOSE UR QL STRIP.AUTO: NEGATIVE MG/DL
KETONES UR QL STRIP.AUTO: NEGATIVE MG/DL
LEUKOCYTE ESTERASE UR QL STRIP.AUTO: ABNORMAL UNIT/L
NITRITE UR QL STRIP.AUTO: NEGATIVE
PH UR STRIP.AUTO: 8 [PH]
PROT UR QL STRIP.AUTO: ABNORMAL MG/DL
RBC #/AREA URNS AUTO: <5 /HPF
RBC UR QL AUTO: NEGATIVE UNIT/L
SP GR UR STRIP.AUTO: 1.01 (ref 1–1.03)
SQUAMOUS #/AREA URNS AUTO: <4 /LPF
UROBILINOGEN UR STRIP-ACNC: 1 MG/DL
WBC #/AREA URNS AUTO: 15 /HPF
YEAST URNS QL MICRO: ABNORMAL /HPF

## 2022-05-26 PROCEDURE — 81001 URINALYSIS AUTO W/SCOPE: CPT

## 2022-05-26 RX ORDER — CIPROFLOXACIN 250 MG/1
250 TABLET, FILM COATED ORAL 2 TIMES DAILY
Qty: 10 TABLET | Refills: 0 | Status: SHIPPED | OUTPATIENT
Start: 2022-05-26 | End: 2022-11-03

## 2022-05-26 NOTE — TELEPHONE ENCOUNTER
----- Message from Karyn Teixeira sent at 2022  9:38 AM CDT -----  Regarding: Pt thinks she has UTI  Pt thinks she has a UTI and would like Lab orders to check it out.           PATIENT PHONE #:  [phone]     : 1945     PHARMACY: [facility]     PHARMACY PHONE #:  [phone]     ALLERGIES:  sulfa     MESSAGE  Please order Labs for Urine , UTI       Patient experiencing Painful, burning, frequent urination.

## 2022-05-26 NOTE — TELEPHONE ENCOUNTER
Kristen kate and a complaint of urinary frequency and burning.  Urinating every hour 2. Would like an antibiotic called out as well as doing a urine and culture.    Rx Cipro sent to her pharmacy.

## 2022-05-28 LAB — BACTERIA UR CULT: NO GROWTH

## 2022-06-06 RX ORDER — ERGOCALCIFEROL 1.25 MG/1
1.25 CAPSULE ORAL WEEKLY
COMMUNITY
End: 2022-06-06 | Stop reason: SDUPTHER

## 2022-06-06 RX ORDER — ERGOCALCIFEROL 1.25 MG/1
50000 CAPSULE ORAL WEEKLY
Qty: 12 CAPSULE | Refills: 1 | Status: SHIPPED | OUTPATIENT
Start: 2022-06-06 | End: 2022-11-22

## 2022-08-10 ENCOUNTER — OFFICE VISIT (OUTPATIENT)
Dept: INTERNAL MEDICINE | Facility: CLINIC | Age: 77
End: 2022-08-10
Payer: MEDICARE

## 2022-08-10 VITALS
WEIGHT: 236 LBS | DIASTOLIC BLOOD PRESSURE: 79 MMHG | OXYGEN SATURATION: 95 % | BODY MASS INDEX: 44.56 KG/M2 | TEMPERATURE: 97 F | SYSTOLIC BLOOD PRESSURE: 138 MMHG | HEART RATE: 72 BPM | HEIGHT: 61 IN

## 2022-08-10 DIAGNOSIS — M54.50 CHRONIC MIDLINE LOW BACK PAIN, UNSPECIFIED WHETHER SCIATICA PRESENT: ICD-10-CM

## 2022-08-10 DIAGNOSIS — E22.2 SIADH (SYNDROME OF INAPPROPRIATE ADH PRODUCTION): ICD-10-CM

## 2022-08-10 DIAGNOSIS — G89.29 CHRONIC MIDLINE LOW BACK PAIN, UNSPECIFIED WHETHER SCIATICA PRESENT: ICD-10-CM

## 2022-08-10 DIAGNOSIS — F41.9 ANXIETY: ICD-10-CM

## 2022-08-10 DIAGNOSIS — I10 PRIMARY HYPERTENSION: ICD-10-CM

## 2022-08-10 DIAGNOSIS — E87.1 HYPONATREMIA: ICD-10-CM

## 2022-08-10 DIAGNOSIS — Z86.39 HISTORY OF SIADH: Primary | ICD-10-CM

## 2022-08-10 PROBLEM — C34.90 MALIGNANT NEOPLASM OF LUNG: Status: ACTIVE | Noted: 2022-08-10

## 2022-08-10 PROBLEM — C34.11 MALIGNANT NEOPLASM OF UPPER LOBE OF RIGHT LUNG: Status: ACTIVE | Noted: 2017-09-25

## 2022-08-10 PROBLEM — C43.71: Status: ACTIVE | Noted: 2021-09-16

## 2022-08-10 PROCEDURE — 99214 PR OFFICE/OUTPT VISIT, EST, LEVL IV, 30-39 MIN: ICD-10-PCS | Mod: 25,,,

## 2022-08-10 PROCEDURE — 96372 THER/PROPH/DIAG INJ SC/IM: CPT | Mod: ,,,

## 2022-08-10 PROCEDURE — 80053 COMPREHEN METABOLIC PANEL: CPT

## 2022-08-10 PROCEDURE — 99214 OFFICE O/P EST MOD 30 MIN: CPT | Mod: 25,,,

## 2022-08-10 PROCEDURE — 96372 PR INJECTION,THERAP/PROPH/DIAG2ST, IM OR SUBCUT: ICD-10-PCS | Mod: ,,,

## 2022-08-10 RX ORDER — LORATADINE 10 MG/1
10 TABLET ORAL
COMMUNITY

## 2022-08-10 RX ORDER — ESCITALOPRAM OXALATE 10 MG/1
10 TABLET ORAL DAILY
COMMUNITY
Start: 2022-04-22 | End: 2022-10-19 | Stop reason: SDUPTHER

## 2022-08-10 RX ORDER — ATORVASTATIN CALCIUM 20 MG/1
20 TABLET, FILM COATED ORAL DAILY
COMMUNITY
Start: 2022-03-31 | End: 2022-10-04 | Stop reason: SDUPTHER

## 2022-08-10 RX ORDER — LOSARTAN POTASSIUM 100 MG/1
100 TABLET ORAL DAILY
COMMUNITY
Start: 2022-03-31 | End: 2022-10-04 | Stop reason: SDUPTHER

## 2022-08-10 RX ORDER — KETOROLAC TROMETHAMINE 15 MG/ML
15 INJECTION, SOLUTION INTRAMUSCULAR; INTRAVENOUS
Status: COMPLETED | OUTPATIENT
Start: 2022-08-10 | End: 2022-08-10

## 2022-08-10 RX ORDER — TRIAMCINOLONE ACETONIDE 5 MG/G
CREAM TOPICAL
COMMUNITY
Start: 2022-03-16 | End: 2023-06-07

## 2022-08-10 RX ORDER — AMLODIPINE BESYLATE 5 MG/1
5 TABLET ORAL DAILY
COMMUNITY
Start: 2022-04-22 | End: 2023-05-10

## 2022-08-10 RX ORDER — IBUPROFEN 100 MG/5ML
1000 SUSPENSION, ORAL (FINAL DOSE FORM) ORAL
COMMUNITY

## 2022-08-10 RX ORDER — PNV NO.95/FERROUS FUM/FOLIC AC 28MG-0.8MG
2000 TABLET ORAL
COMMUNITY

## 2022-08-10 RX ORDER — FUROSEMIDE 20 MG/1
20 TABLET ORAL
COMMUNITY
Start: 2022-07-24

## 2022-08-10 RX ORDER — PREDNISONE 20 MG/1
20 TABLET ORAL 3 TIMES DAILY
COMMUNITY
Start: 2022-05-19 | End: 2022-10-04

## 2022-08-10 RX ORDER — PANTOPRAZOLE SODIUM 40 MG/1
1 TABLET, DELAYED RELEASE ORAL EVERY MORNING
COMMUNITY
Start: 2022-07-31 | End: 2022-12-14

## 2022-08-10 RX ORDER — ONDANSETRON HYDROCHLORIDE 8 MG/1
8 TABLET, FILM COATED ORAL EVERY 8 HOURS PRN
COMMUNITY
Start: 2022-04-27 | End: 2022-12-14

## 2022-08-10 RX ORDER — MAGNESIUM 30 MG
400 TABLET ORAL
COMMUNITY

## 2022-08-10 RX ADMIN — KETOROLAC TROMETHAMINE 15 MG: 15 INJECTION, SOLUTION INTRAMUSCULAR; INTRAVENOUS at 03:08

## 2022-08-10 NOTE — ASSESSMENT & PLAN NOTE
-IM Toradol in office  -referred to physical therapy  -heat application  -range of motion exercises  -okay to utilize OTC salves or lidocaine patch  -okay to utilize Tylenol/ibuprofen interchangeably p.r.n. discomfort

## 2022-08-10 NOTE — ASSESSMENT & PLAN NOTE
-ACTH stimulation test showed features of adrenal insufficiency, refer to Endocrinology  -recent ER visit for hyponatremia, obtain CMP today

## 2022-08-10 NOTE — PROGRESS NOTES
Patient ID: Arti Knowles is a 76 y.o. female.    Chief Complaint: Follow-up (C/o back pain )    Transitional Care Note    Family and/or Caretaker present at visit?  No.  Diagnostic tests reviewed/disposition: I have reviewed all completed as well as pending diagnostic tests at the time of discharge.  Disease/illness education:  Hyponatremia  Home health/community services discussion/referrals: Patient does not have home health established from hospital visit.  They do not need home health.  If needed, we will set up home health for the patient.   Establishment or re-establishment of referral orders for community resources: Referral placed for endocrinology and nephrology.   Discussion with other health care providers: Case discussed with patient's PCP Dr. Raffy Beckham.         76 y.o. female seen today for hospital discharge visit.  Patient presented to the ED on 7/26/22 with complaints of ongoing nausea, vomiting and fatigue. ED work-up revealed sodium 118, potassium 3.8, chloride 83, BUN 4, creatinine 1.55, alk phos 310, , , magnesium 1.6, glucose 80, H/H 12.7 and 37.7 and UA positive for ketones, blood, bacteria and leuk esterase.  Evaluated by Nephrology, received 3% normal saline,Samsca x1 with sodium later corrected. She also received potassium and magnesium replacement.  ACTH stimulation test showed features of adrenal insufficiency.  Subsequently discharged on steroids.  Since discharge patient reports she is feeling fairly well without any recurrent episodes.  Only voices concerns regarding some ongoing lower back discomfort.  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  Subsequently given IM Toradol injection and referred to physical therapy.  Discussed continuing workup by repeating CMP, titrating off of Lexapro for possible hyponatremia adverse effect, referring to Endocrinology and Nephrology.  Otherwise no other acute medical  "concerns noted today.      MEDICAL HISTORY:    Past Medical History:   Diagnosis Date    Arthritis     Diabetes mellitus type I     Hyperlipidemia     Hypertension       Past Surgical History:   Procedure Laterality Date    APPENDECTOMY      BIOPSY      DILATION AND CURETTAGE OF UTERUS      HYSTERECTOMY      KNEE SURGERY      lymph nodes surgery      THYROIDECTOMY, PARTIAL      TOE SURGERY      TONSILLECTOMY      TOTAL SHOULDER ARTHROPLASTY        Social History     Tobacco Use    Smoking status: Never Smoker    Smokeless tobacco: Never Used   Substance Use Topics    Alcohol use: Not Currently          Health Maintenance Due   Topic Date Due    Hepatitis C Screening  Never done    TETANUS VACCINE  Never done    Shingles Vaccine (2 of 3) 11/09/2012    Pneumococcal Vaccines (Age 65+) (2 - PCV) 08/10/2013    DEXA Scan  03/07/2022    COVID-19 Vaccine (4 - Booster for Moderna series) 03/15/2022        Review of Systems    Objective:   /79   Pulse 72   Temp 97.3 °F (36.3 °C)   Ht 5' 1" (1.549 m)   Wt 107 kg (236 lb)   SpO2 95%   BMI 44.59 kg/m²      Physical Exam    Assessment:     Problem List Items Addressed This Visit        Psychiatric    Anxiety     -previously initiated on Lexapro due to increase family stressors, however patient reports situation much improved.  -discussion had with patient regarding possibly titrating off of Lexapro due to possible hyponatremia side effect, for which she was in agreeance since prior circumstances improved.  Titration off schedule given to patient              Cardiac/Vascular    Hypertension     -currently on amlodipine 5 mg, furosemide 20 mg, losartan 100 mg, continue  -low-sodium diet              Renal/    Hyponatremia     -recent ER admission for hyponatremia Na 118, repeat CMP today  -currently on furosemide 20 mg per Nephrology, encouraged to follow-up on possible dose reduction  -titrate off of Lexapro due to possible hyponatremia  -ACTH " stimulation test showed features of adrenal insufficiency, refer to Endocrinology           Relevant Orders    Comprehensive Metabolic Panel    Ambulatory referral/consult to Endocrinology    Ambulatory referral/consult to Nephrology       Endocrine    SIADH (syndrome of inappropriate ADH production)     -ACTH stimulation test showed features of adrenal insufficiency, refer to Endocrinology  -recent ER visit for hyponatremia, obtain CMP today              Orthopedic    Chronic midline low back pain     -IM Toradol in office  -referred to physical therapy  -heat application  -range of motion exercises  -okay to utilize OTC salves or lidocaine patch  -okay to utilize Tylenol/ibuprofen interchangeably p.r.n. discomfort           Relevant Medications    ketorolac injection 15 mg (Completed)    Other Relevant Orders    Ambulatory referral/consult to Physical/Occupational Therapy      Other Visit Diagnoses     History of SIADH    -  Primary    Relevant Orders    Comprehensive Metabolic Panel    Ambulatory referral/consult to Endocrinology           Plan:   No follow-ups on file.     -plan specifics discussed above    Orders Placed This Encounter    Comprehensive Metabolic Panel    Ambulatory referral/consult to Physical/Occupational Therapy    Ambulatory referral/consult to Endocrinology    Ambulatory referral/consult to Nephrology    ketorolac injection 15 mg        Medication List with Changes/Refills   Current Medications    AMLODIPINE (NORVASC) 5 MG TABLET    Take 5 mg by mouth once daily.    ASCORBIC ACID, VITAMIN C, (VITAMIN C) 1000 MG TABLET    Take 1,000 mg by mouth.    ATORVASTATIN (LIPITOR) 20 MG TABLET    Take 20 mg by mouth once daily.    CIPROFLOXACIN HCL (CIPRO) 250 MG TABLET    Take 1 tablet (250 mg total) by mouth 2 (two) times daily.    ERGOCALCIFEROL (ERGOCALCIFEROL) 50,000 UNIT CAP    Take 1 capsule (50,000 Units total) by mouth once a week.    ESCITALOPRAM OXALATE (LEXAPRO) 10 MG TABLET    Take 10  mg by mouth once daily.    FUROSEMIDE (LASIX) 20 MG TABLET    Take 20 mg by mouth.    LACTOBACILLUS ACIDOPHILUS 10 BILLION CELL CAP    Take 1 tablet by mouth every morning.    LORATADINE (CLARITIN) 10 MG TABLET    Take 10 mg by mouth.    LOSARTAN (COZAAR) 100 MG TABLET    Take 100 mg by mouth once daily.    MAGNESIUM 30 MG TAB    Take 400 mg by mouth.    ONDANSETRON (ZOFRAN) 8 MG TABLET    Take 8 mg by mouth every 8 (eight) hours as needed.    PANTOPRAZOLE (PROTONIX) 40 MG TABLET    Take 1 tablet by mouth every morning.    PREDNISONE (DELTASONE) 20 MG TABLET    Take 20 mg by mouth 3 (three) times daily.    TRIAMCINOLONE ACETONIDE 0.5% (KENALOG) 0.5 % CREA    APPLY TOPICALLY 3 (THREE) TIMES DAILY  DAYS.    VITAMIN E 1000 UNIT CAPSULE    Take 2,000 Units by mouth.    ZINC 50 MG TAB    Take 1 tablet by mouth once daily.

## 2022-08-10 NOTE — ASSESSMENT & PLAN NOTE
-recent ER admission for hyponatremia Na 118, repeat CMP today  -currently on furosemide 20 mg per Nephrology, encouraged to follow-up on possible dose reduction  -titrate off of Lexapro due to possible hyponatremia  -ACTH stimulation test showed features of adrenal insufficiency, refer to Endocrinology

## 2022-08-10 NOTE — ASSESSMENT & PLAN NOTE
-previously initiated on Lexapro due to increase family stressors, however patient reports situation much improved.  -discussion had with patient regarding possibly titrating off of Lexapro due to possible hyponatremia side effect, for which she was in agreeance since prior circumstances improved.  Titration off schedule given to patient

## 2022-08-12 ENCOUNTER — TELEPHONE (OUTPATIENT)
Dept: INTERNAL MEDICINE | Facility: CLINIC | Age: 77
End: 2022-08-12
Payer: MEDICARE

## 2022-08-12 NOTE — TELEPHONE ENCOUNTER
----- Message from MILO Balderas sent at 8/11/2022  1:05 PM CDT -----  Please contact the patient and let them know that their results for their sodium has remained stable since her hospital discharge.  Continue with current treatment.

## 2022-08-24 ENCOUNTER — TELEPHONE (OUTPATIENT)
Dept: INTERNAL MEDICINE | Facility: CLINIC | Age: 77
End: 2022-08-24
Payer: MEDICARE

## 2022-08-24 NOTE — TELEPHONE ENCOUNTER
----- Message from Chely Painting sent at 8/24/2022  1:48 PM CDT -----  Regarding: pt request callback  Patient wants a callback regarding xray that was ordered call her at [phone]

## 2022-08-29 ENCOUNTER — TELEPHONE (OUTPATIENT)
Dept: INTERNAL MEDICINE | Facility: CLINIC | Age: 77
End: 2022-08-29
Payer: MEDICARE

## 2022-08-29 NOTE — TELEPHONE ENCOUNTER
----- Message from MILO Baldears sent at 8/24/2022  7:31 PM CDT -----  Regarding: RE: call patient regarding xray  Pet scan i'm guessing ordered by her oncologist? I don't see any X-rays recently ordered by our clinic. Please contact patient and ensure she received her results from her oncologist for the PET scan first. If they need an Xray for finding of the pet scan they typically order it themselves, if not we will look at results and evaluate further.         ----- Message -----  From: Yolanda Ray MA  Sent: 8/24/2022   9:06 AM CDT  To: MILO Balderas  Subject: FW: call patient regarding xray                  Please advise?  ----- Message -----  From: Chely Painting  Sent: 8/24/2022   8:35 AM CDT  To: Phill TERRELL Staff  Subject: call patient regarding xray                      Patient needs a callback at [phone] regarding xray Marquez ordered. She said she did a pet scan on 8/15 and wanted to know if she needs xray.

## 2022-09-08 ENCOUNTER — TELEPHONE (OUTPATIENT)
Dept: INTERNAL MEDICINE | Facility: CLINIC | Age: 77
End: 2022-09-08
Payer: MEDICARE

## 2022-09-08 NOTE — TELEPHONE ENCOUNTER
----- Message from Chely Painting sent at 9/8/2022  8:37 AM CDT -----  Regarding: pt referral  Patient wants referral for pt and needling sent to physical therapy [facility]-[address] [place] [address] [phone]. Pt contact number [phone]

## 2022-10-04 DIAGNOSIS — E78.5 HYPERLIPIDEMIA, UNSPECIFIED HYPERLIPIDEMIA TYPE: ICD-10-CM

## 2022-10-04 DIAGNOSIS — I10 PRIMARY HYPERTENSION: ICD-10-CM

## 2022-10-04 DIAGNOSIS — E22.2 SIADH (SYNDROME OF INAPPROPRIATE ADH PRODUCTION): ICD-10-CM

## 2022-10-04 DIAGNOSIS — E87.1 HYPONATREMIA: Primary | ICD-10-CM

## 2022-10-04 RX ORDER — LOSARTAN POTASSIUM 100 MG/1
100 TABLET ORAL DAILY
Qty: 90 TABLET | Refills: 3 | Status: SHIPPED | OUTPATIENT
Start: 2022-10-04 | End: 2023-09-18

## 2022-10-04 RX ORDER — HYDROCORTISONE 10 MG/1
20 TABLET ORAL 3 TIMES DAILY
COMMUNITY
Start: 2022-08-08 | End: 2022-10-04 | Stop reason: SDUPTHER

## 2022-10-04 RX ORDER — ATORVASTATIN CALCIUM 20 MG/1
20 TABLET, FILM COATED ORAL DAILY
Qty: 90 TABLET | Refills: 3 | Status: SHIPPED | OUTPATIENT
Start: 2022-10-04 | End: 2023-09-18

## 2022-10-04 RX ORDER — HYDROCORTISONE 10 MG/1
20 TABLET ORAL 3 TIMES DAILY
Qty: 540 TABLET | Refills: 3 | Status: SHIPPED | OUTPATIENT
Start: 2022-10-04 | End: 2022-10-04

## 2022-10-19 DIAGNOSIS — F32.A DEPRESSION, UNSPECIFIED DEPRESSION TYPE: Primary | ICD-10-CM

## 2022-10-19 DIAGNOSIS — F41.9 ANXIETY: ICD-10-CM

## 2022-10-19 RX ORDER — ESCITALOPRAM OXALATE 10 MG/1
10 TABLET ORAL DAILY
Qty: 30 TABLET | Refills: 11 | Status: SHIPPED | OUTPATIENT
Start: 2022-10-19 | End: 2022-12-14

## 2022-11-03 RX ORDER — ALPRAZOLAM 0.25 MG/1
0.25 TABLET ORAL DAILY PRN
Qty: 30 TABLET | Refills: 5 | Status: SHIPPED | OUTPATIENT
Start: 2022-11-03 | End: 2024-02-15

## 2022-11-03 RX ORDER — ALPRAZOLAM 0.25 MG/1
1 TABLET ORAL DAILY PRN
COMMUNITY
End: 2022-11-03 | Stop reason: SDUPTHER

## 2022-11-03 NOTE — TELEPHONE ENCOUNTER
----- Message from Chely Painting sent at 11/3/2022  9:52 AM CDT -----  Regarding: refill  Needs xanax .25 mg prn sent to [facility] [phone]

## 2022-12-07 ENCOUNTER — TELEPHONE (OUTPATIENT)
Dept: INTERNAL MEDICINE | Facility: CLINIC | Age: 77
End: 2022-12-07
Payer: MEDICARE

## 2022-12-07 DIAGNOSIS — Z12.31 BREAST CANCER SCREENING BY MAMMOGRAM: Primary | ICD-10-CM

## 2022-12-07 NOTE — TELEPHONE ENCOUNTER
----- Message from MILO Balderas sent at 12/6/2022  2:09 PM CST -----  Regarding: FW: question for Dr soler    ----- Message -----  From: Chely Painting  Sent: 12/6/2022   8:47 AM CST  To: Raffy Soler MD  Subject: question for Dr soler                         Patient said she had a full body pet scan and is she's due for a mammogram and she wants to know if she still has to do mammogram. Her number is [phone]

## 2022-12-12 ENCOUNTER — TELEPHONE (OUTPATIENT)
Dept: INTERNAL MEDICINE | Facility: CLINIC | Age: 77
End: 2022-12-12
Payer: MEDICARE

## 2022-12-12 NOTE — TELEPHONE ENCOUNTER
----- Message from Amanda Lorenz sent at 12/12/2022  8:59 AM CST -----  Regarding: swollen/ painful wrists  Swollen and painful wrist.    R wrist is worse.    Did not pull or twist it.  Has been going on 4-5 weeks    Arti  [phone]

## 2022-12-13 PROBLEM — F41.9 ANXIETY: Chronic | Status: ACTIVE | Noted: 2022-08-10

## 2022-12-13 PROBLEM — I10 HYPERTENSION: Chronic | Status: ACTIVE | Noted: 2022-08-10

## 2022-12-14 ENCOUNTER — HOSPITAL ENCOUNTER (OUTPATIENT)
Dept: RADIOLOGY | Facility: HOSPITAL | Age: 77
Discharge: HOME | End: 2022-12-14
Attending: INTERNAL MEDICINE
Payer: MEDICARE

## 2022-12-14 ENCOUNTER — OFFICE VISIT (OUTPATIENT)
Dept: INTERNAL MEDICINE | Facility: CLINIC | Age: 77
End: 2022-12-14
Payer: MEDICARE

## 2022-12-14 VITALS
BODY MASS INDEX: 44.4 KG/M2 | SYSTOLIC BLOOD PRESSURE: 138 MMHG | HEART RATE: 73 BPM | DIASTOLIC BLOOD PRESSURE: 76 MMHG | WEIGHT: 235.19 LBS | HEIGHT: 61 IN | OXYGEN SATURATION: 96 %

## 2022-12-14 DIAGNOSIS — E22.2 SIADH (SYNDROME OF INAPPROPRIATE ADH PRODUCTION): ICD-10-CM

## 2022-12-14 DIAGNOSIS — E78.5 HYPERLIPIDEMIA, UNSPECIFIED HYPERLIPIDEMIA TYPE: ICD-10-CM

## 2022-12-14 DIAGNOSIS — M25.539 PAIN IN WRIST, UNSPECIFIED LATERALITY: ICD-10-CM

## 2022-12-14 DIAGNOSIS — C43.71: ICD-10-CM

## 2022-12-14 DIAGNOSIS — I10 PRIMARY HYPERTENSION: Chronic | ICD-10-CM

## 2022-12-14 DIAGNOSIS — E87.1 HYPONATREMIA: ICD-10-CM

## 2022-12-14 DIAGNOSIS — F41.9 ANXIETY: Primary | Chronic | ICD-10-CM

## 2022-12-14 PROCEDURE — 73120 X-RAY EXAM OF HAND: CPT | Mod: TC,50

## 2022-12-14 PROCEDURE — 99213 PR OFFICE/OUTPT VISIT, EST, LEVL III, 20-29 MIN: ICD-10-PCS | Mod: ,,, | Performed by: INTERNAL MEDICINE

## 2022-12-14 PROCEDURE — 73110 X-RAY EXAM OF WRIST: CPT | Mod: TC,50

## 2022-12-14 PROCEDURE — 99213 OFFICE O/P EST LOW 20 MIN: CPT | Mod: ,,, | Performed by: INTERNAL MEDICINE

## 2022-12-14 RX ORDER — PROMETHAZINE HYDROCHLORIDE 25 MG/1
25 TABLET ORAL EVERY 4 HOURS PRN
COMMUNITY
End: 2023-06-07

## 2022-12-14 NOTE — PROGRESS NOTES
Raffy Rosa MD        PATIENT NAME: Arti Knowles  : 1945  DATE: 22  MRN: [id number]      Billing Provider: Raffy Rosa MD  Level of Service: [place] OFFICE/OUTPT VISIT, EST, LEVL III, 20-29 MIN  Patient PCP Information       Provider PCP Type    Raffy Rosa MD General            Reason for Visit / Chief Complaint: Wrist Pain       Update PCP  Update Chief Complaint         History of Present Illness / Problem Focused Workflow     Arti Knowles presents to the clinic with Wrist Pain     Arti is here for a specific problem today   She is having pain and swelling in both of her wrists the last few weeks   She is on her chemotherapy medications with this may be a side effect.    She is also having trouble with her balance would like to go to physical therapy.      Review of Systems   Review of Systems   Constitutional: Negative.    HENT: Negative.     Eyes: Negative.    Respiratory: Negative.     Cardiovascular: Negative.    Gastrointestinal: Negative.    Endocrine: Negative.    Genitourinary: Negative.    Musculoskeletal: Negative.    Integumentary:  Negative.   Neurological: Negative.    Psychiatric/Behavioral: Negative.        Medical / Social / Family History     Past Medical History:   Diagnosis Date    Hyperlipidemia     Hypertension        Past Surgical History:   Procedure Laterality Date    APPENDECTOMY      BIOPSY      DILATION AND CURETTAGE OF UTERUS      HYSTERECTOMY      KNEE SURGERY      lymph nodes surgery      THYROIDECTOMY, PARTIAL      TOE SURGERY      TONSILLECTOMY      TOTAL SHOULDER ARTHROPLASTY         Social History  Ms. Knowles  reports that she has never smoked. She has never used smokeless tobacco. She reports that she does not currently use alcohol.    Family History  Ms.'s Knowles  family history includes Cancer in her father and mother; Heart disease in her father.    Medications and Allergies     Medications  Outpatient Medications Marked as  Taking for the 12/14/22 encounter (Office Visit) with Raffy Beckham MD   Medication Sig Dispense Refill    ALPRAZolam (XANAX) 0.25 MG tablet Take 1 tablet (0.25 mg total) by mouth daily as needed for Insomnia or Anxiety. 30 tablet 5    amLODIPine (NORVASC) 5 MG tablet Take 5 mg by mouth once daily.      ascorbic acid, vitamin C, (VITAMIN C) 1000 MG tablet Take 1,000 mg by mouth.      atorvastatin (LIPITOR) 20 MG tablet Take 1 tablet (20 mg total) by mouth once daily. 90 tablet 3    ergocalciferol (ERGOCALCIFEROL) 50,000 unit Cap TAKE 1 CAPSULE BY MOUTH ONE TIME PER WEEK 12 capsule 3    furosemide (LASIX) 20 MG tablet Take 20 mg by mouth.      hydrocortisone (CORTEF) 10 MG Tab Take 2 tablets (20 mg total) by mouth every morning AND 1 tablet (10 mg total) nightly. (Patient taking differently: Take 2 tablets (15 mg total) by mouth every morning AND 1 tablet (5 mg total) nightly.) 270 tablet 3    Lactobacillus acidophilus 10 billion cell Cap Take 1 tablet by mouth every morning.      loratadine (CLARITIN) 10 mg tablet Take 10 mg by mouth.      losartan (COZAAR) 100 MG tablet Take 1 tablet (100 mg total) by mouth once daily. 90 tablet 3    magnesium 30 mg Tab Take 400 mg by mouth.      promethazine (PHENERGAN) 25 MG tablet Take 25 mg by mouth every 4 (four) hours as needed.      triamcinolone acetonide 0.5% (KENALOG) 0.5 % Crea APPLY TOPICALLY 3 (THREE) TIMES DAILY  DAYS.      vitamin E 1000 UNIT capsule Take 2,000 Units by mouth.      zinc 50 mg Tab Take 1 tablet by mouth once daily.      [DISCONTINUED] pantoprazole (PROTONIX) 40 MG tablet Take 1 tablet by mouth every morning.         Allergies  Review of patient's allergies indicates:   Allergen Reactions    Sulfa (sulfonamide antibiotics) Itching and Swelling     welts       Physical Examination     Vitals:    12/14/22 1120   BP: 138/76   Pulse: 73     Physical Exam  Constitutional:       Appearance: Normal appearance.   HENT:      Head: Normocephalic and  atraumatic.      Right Ear: Tympanic membrane normal.      Left Ear: Tympanic membrane normal.      Nose: Nose normal.      Mouth/Throat:      Mouth: Mucous membranes are moist.   Eyes:      Extraocular Movements: Extraocular movements intact.      Pupils: Pupils are equal, round, and reactive to light.   Cardiovascular:      Rate and Rhythm: Normal rate and regular rhythm.      Pulses: Normal pulses.   Pulmonary:      Effort: Pulmonary effort is normal.      Breath sounds: Normal breath sounds.   Abdominal:      General: Abdomen is flat. Bowel sounds are normal.      Palpations: Abdomen is soft.   Musculoskeletal:         General: Normal range of motion.      Cervical back: Normal range of motion and neck supple.      Comments: Swelling and tenderness on the both wrists with decreased range of motion and pain with motion.   Skin:     General: Skin is warm and dry.   Neurological:      General: No focal deficit present.      Mental Status: She is alert and oriented to person, place, and time.   Psychiatric:         Mood and Affect: Mood normal.         Behavior: Behavior normal.        Assessment and Plan (including Health Maintenance)      Problem List  Smart SiteBrand  Document Outside HM   :    Plan:   Anxiety    Hyperlipidemia, unspecified hyperlipidemia type    Primary hypertension    Hyponatremia    Melanoma of toe, right    SIADH (syndrome of inappropriate ADH production)    Pain in wrist, unspecified laterality     X-ray both wrists   X-ray both hands   Rheumatoid factor sed rate C-reactive protein ordered  Referral to physical therapy for her issues with her balance.           Health Maintenance Due   Topic Date Due    Hepatitis C Screening  Never done    TETANUS VACCINE  Never done    Shingles Vaccine (1 of 2) 11/09/2012    Pneumococcal Vaccines (Age 65+) (2 - PCV) 08/10/2013    COVID-19 Vaccine (4 - Booster for Moderna series) 01/10/2022    DEXA Scan  03/07/2022       Problem List Items Addressed This Visit           Psychiatric    Anxiety - Primary (Chronic)    Overview     has a son with mental illness            Cardiac/Vascular    Hypertension (Chronic)    Hyperlipidemia (Chronic)       Renal/    Hyponatremia (Chronic)       Oncology    Melanoma of toe, right       Endocrine    SIADH (syndrome of inappropriate ADH production)     Other Visit Diagnoses       Pain in wrist, unspecified laterality                Health Maintenance Topics with due status: Not Due       Topic Last Completion Date    Lipid Panel 04/05/2022       Future Appointments   Date Time Provider Department Center   6/7/2023  1:40 PM Raffy Beckham MD [place] [address]            Signature:  Raffy Beckham MD  [facility]  [facility] [facility] [id number]    Date of encounter: 12/14/22

## 2022-12-16 ENCOUNTER — TELEPHONE (OUTPATIENT)
Dept: INTERNAL MEDICINE | Facility: CLINIC | Age: 77
End: 2022-12-16
Payer: MEDICARE

## 2022-12-16 RX ORDER — CELECOXIB 200 MG/1
200 CAPSULE ORAL DAILY
Qty: 14 CAPSULE | Refills: 5 | Status: SHIPPED | OUTPATIENT
Start: 2022-12-16 | End: 2023-03-16 | Stop reason: SDUPTHER

## 2022-12-16 NOTE — TELEPHONE ENCOUNTER
Call back to patient x-rays show significant arthritis elevated inflammatory markers rheumatoid factor pending   Plans to sound her a prescription for Celebrex daily for 2 weeks.

## 2022-12-19 ENCOUNTER — TELEPHONE (OUTPATIENT)
Dept: INTERNAL MEDICINE | Facility: CLINIC | Age: 77
End: 2022-12-19
Payer: MEDICARE

## 2022-12-19 NOTE — TELEPHONE ENCOUNTER
Call back to patient on her results   Lab studies are negative for rheumatoid sed rate stable   X-rays show advanced arthritic changes   She is doing better on the Celebrex and feeling better.

## 2023-01-18 ENCOUNTER — DOCUMENTATION ONLY (OUTPATIENT)
Dept: ADMINISTRATIVE | Facility: HOSPITAL | Age: 78
End: 2023-01-18
Payer: MEDICARE

## 2023-01-18 LAB — BCS RECOMMENDATION EXT: NORMAL

## 2023-02-06 ENCOUNTER — TELEPHONE (OUTPATIENT)
Dept: INTERNAL MEDICINE | Facility: CLINIC | Age: 78
End: 2023-02-06
Payer: MEDICARE

## 2023-02-06 DIAGNOSIS — F32.A DEPRESSION, UNSPECIFIED DEPRESSION TYPE: Primary | ICD-10-CM

## 2023-02-06 RX ORDER — BUPROPION HYDROCHLORIDE 150 MG/1
150 TABLET ORAL DAILY
Qty: 30 TABLET | Refills: 11 | Status: SHIPPED | OUTPATIENT
Start: 2023-02-06 | End: 2024-01-29

## 2023-02-06 NOTE — TELEPHONE ENCOUNTER
----- Message from Chely Painting sent at 2/6/2023  8:24 AM CST -----  Regarding: lexapro  Patient left voicemail that she discontinued lexapro some months ago but wants to resume now because she is having some family difficulties. Call her at [phone]    
Decrease energy, son had been in and out of [facility] for the past few weeks, and upon recent admit may have to move in with Pt, she is depressed, new medication sent per Np, pt stated understanding   
Please clarify if she is feeling anxious or depressed?  We had taken her off of Lexapro for hyponatremia.  Depending on which she is having we can choose an alternative medication for her and hopefully will not cause her low-sodium.
98

## 2023-03-02 ENCOUNTER — TELEPHONE (OUTPATIENT)
Dept: INTERNAL MEDICINE | Facility: CLINIC | Age: 78
End: 2023-03-02
Payer: MEDICARE

## 2023-03-02 DIAGNOSIS — E04.9 GOITER: Primary | ICD-10-CM

## 2023-03-02 NOTE — TELEPHONE ENCOUNTER
Call back to patient   Her last ultrasound was May of 2021 and I would refer her to ENT Dr. Moore for evaluation of the substernal extension   At this time she says she is had increasing hoarseness would like to repeat the scan it she will call Dr. Moore for appointment and I will schedule the ultrasound

## 2023-03-09 ENCOUNTER — HOSPITAL ENCOUNTER (OUTPATIENT)
Dept: RADIOLOGY | Facility: HOSPITAL | Age: 78
Discharge: HOME | End: 2023-03-09
Attending: INTERNAL MEDICINE
Payer: MEDICARE

## 2023-03-09 DIAGNOSIS — E04.9 GOITER: ICD-10-CM

## 2023-03-09 PROCEDURE — 76536 US EXAM OF HEAD AND NECK: CPT | Mod: TC

## 2023-03-16 ENCOUNTER — TELEPHONE (OUTPATIENT)
Dept: INTERNAL MEDICINE | Facility: CLINIC | Age: 78
End: 2023-03-16
Payer: MEDICARE

## 2023-03-16 DIAGNOSIS — G89.29 CHRONIC MIDLINE LOW BACK PAIN, UNSPECIFIED WHETHER SCIATICA PRESENT: ICD-10-CM

## 2023-03-16 DIAGNOSIS — M54.50 CHRONIC MIDLINE LOW BACK PAIN, UNSPECIFIED WHETHER SCIATICA PRESENT: ICD-10-CM

## 2023-03-16 DIAGNOSIS — M19.90 ARTHRITIS: Primary | ICD-10-CM

## 2023-03-16 RX ORDER — CELECOXIB 200 MG/1
200 CAPSULE ORAL DAILY
Qty: 30 CAPSULE | Refills: 2 | Status: SHIPPED | OUTPATIENT
Start: 2023-03-16 | End: 2023-06-07 | Stop reason: SDUPTHER

## 2023-03-16 NOTE — TELEPHONE ENCOUNTER
----- Message from Hayley Peters sent at 3/16/2023  4:19 PM CDT -----  Regarding: Refill Request  .Type:  RX Refill Request    Who Called: pt  Refill or New Rx:refill   RX Name and Strength:celecoxib (CELEBREX) 200 MG capsule  How is the patient currently taking it? (ex. 1XDay):1X  Is this a 30 day or 90 day RX:30  Preferred Pharmacy with phone number:[facility]/PHARMACY #[id number]  [place] - [address] [facility]  Local or Mail Order:Local  Ordering Provider:Raffy Beckham  Would the patient rather a call back or a response via [facility]?   Best Call Back Number:[phone]  Additional Information: pt is requesting an 30-day supply instead of 2weeks, reason too many trip to the pharmacy, pt is also out of medication, please advise

## 2023-03-16 NOTE — TELEPHONE ENCOUNTER
Pt requested refill on Celebrex stated that she would like a months supply instead of 2-week supply due to her arthritis.Refill sent to [facility] on [address].

## 2023-03-20 ENCOUNTER — TELEPHONE (OUTPATIENT)
Dept: INTERNAL MEDICINE | Facility: CLINIC | Age: 78
End: 2023-03-20
Payer: MEDICARE

## 2023-03-20 NOTE — TELEPHONE ENCOUNTER
Call back to patient about thyroid ultrasound   Ultrasound is fairly stable with no new masses   She saw Dr. Moore and he felt her hoarseness was due to her reflux  She is seen him back in 6 weeks and will discuss the thyroid ultrasound with him then.

## 2023-04-26 ENCOUNTER — HOSPITAL ENCOUNTER (OUTPATIENT)
Dept: RADIOLOGY | Facility: HOSPITAL | Age: 78
Discharge: HOME | End: 2023-04-26
Attending: INTERNAL MEDICINE
Payer: MEDICARE

## 2023-04-26 ENCOUNTER — OFFICE VISIT (OUTPATIENT)
Dept: INTERNAL MEDICINE | Facility: CLINIC | Age: 78
End: 2023-04-26
Payer: MEDICARE

## 2023-04-26 VITALS
OXYGEN SATURATION: 96 % | BODY MASS INDEX: 41.8 KG/M2 | HEART RATE: 73 BPM | HEIGHT: 61 IN | SYSTOLIC BLOOD PRESSURE: 138 MMHG | DIASTOLIC BLOOD PRESSURE: 68 MMHG | WEIGHT: 221.38 LBS

## 2023-04-26 DIAGNOSIS — M25.519 SHOULDER PAIN, UNSPECIFIED CHRONICITY, UNSPECIFIED LATERALITY: ICD-10-CM

## 2023-04-26 DIAGNOSIS — K59.00 CONSTIPATION, UNSPECIFIED CONSTIPATION TYPE: ICD-10-CM

## 2023-04-26 PROCEDURE — 99213 PR OFFICE/OUTPT VISIT, EST, LEVL III, 20-29 MIN: ICD-10-PCS | Mod: ,,, | Performed by: INTERNAL MEDICINE

## 2023-04-26 PROCEDURE — 99213 OFFICE O/P EST LOW 20 MIN: CPT | Mod: ,,, | Performed by: INTERNAL MEDICINE

## 2023-04-26 PROCEDURE — 73030 X-RAY EXAM OF SHOULDER: CPT | Mod: TC,LT

## 2023-04-26 RX ORDER — FAMOTIDINE 40 MG/1
TABLET, FILM COATED ORAL
COMMUNITY
Start: 2023-03-15

## 2023-04-26 NOTE — PROGRESS NOTES
Raffy Beckham MD        PATIENT NAME: Arti Knowles  : 1945  DATE: 23  MRN: [id number]      Billing Provider: Raffy Beckham MD  Level of Service: [place] OFFICE/OUTPT VISIT, EST, LEVL III, 20-29 MIN  Patient PCP Information       Provider PCP Type    Raffy Beckham MD General            Reason for Visit / Chief Complaint: Constipation (Constipation/Shoulder Pain)       Update PCP  Update Chief Complaint         History of Present Illness / Problem Focused Workflow     Arti Knowles presents to the clinic with Constipation (Constipation/Shoulder Pain)     Arti is here for 2 problems 1st constipation going on for few weeks 2nd in his left shoulder pain which bothering her for 1-2 months.      Review of Systems   Review of Systems   Constitutional: Negative.    HENT: Negative.     Eyes: Negative.    Respiratory: Negative.     Cardiovascular: Negative.    Gastrointestinal: Negative.    Endocrine: Negative.    Genitourinary: Negative.    Musculoskeletal: Negative.    Integumentary:  Negative.   Neurological: Negative.    Psychiatric/Behavioral: Negative.        Medical / Social / Family History     Past Medical History:   Diagnosis Date    Hyperlipidemia     Hypertension        Past Surgical History:   Procedure Laterality Date    APPENDECTOMY      BIOPSY      DILATION AND CURETTAGE OF UTERUS      HYSTERECTOMY      KNEE SURGERY      lymph nodes surgery      THYROIDECTOMY, PARTIAL      TOE SURGERY      TONSILLECTOMY      TOTAL SHOULDER ARTHROPLASTY         Social History  Ms. Knowles  reports that she has never smoked. She has never used smokeless tobacco. She reports that she does not currently use alcohol.    Family History  Ms.'s Knowles  family history includes Cancer in her father and mother; Heart disease in her father.    Medications and Allergies     Medications  Outpatient Medications Marked as Taking for the 23 encounter (Office Visit) with Raffy Beckham MD    Medication Sig Dispense Refill    ALPRAZolam (XANAX) 0.25 MG tablet Take 1 tablet (0.25 mg total) by mouth daily as needed for Insomnia or Anxiety. 30 tablet 5    amLODIPine (NORVASC) 5 MG tablet Take 5 mg by mouth once daily.      ascorbic acid, vitamin C, (VITAMIN C) 1000 MG tablet Take 1,000 mg by mouth.      atorvastatin (LIPITOR) 20 MG tablet Take 1 tablet (20 mg total) by mouth once daily. 90 tablet 3    buPROPion (WELLBUTRIN XL) 150 MG TB24 tablet Take 1 tablet (150 mg total) by mouth once daily. 30 tablet 11    celecoxib (CELEBREX) 200 MG capsule Take 1 capsule (200 mg total) by mouth once daily. 30 capsule 2    ergocalciferol (ERGOCALCIFEROL) 50,000 unit Cap TAKE 1 CAPSULE BY MOUTH ONE TIME PER WEEK 12 capsule 3    famotidine (PEPCID) 40 MG tablet Take by mouth.      furosemide (LASIX) 20 MG tablet Take 20 mg by mouth.      hydrocortisone (CORTEF) 10 MG Tab Take 2 tablets (20 mg total) by mouth every morning AND 1 tablet (10 mg total) nightly. (Patient taking differently: Take 2 tablets (15 mg total) by mouth every morning AND 1 tablet (5 mg total) nightly.) 270 tablet 3    Lactobacillus acidophilus 10 billion cell Cap Take 1 tablet by mouth every morning.      loratadine (CLARITIN) 10 mg tablet Take 10 mg by mouth.      losartan (COZAAR) 100 MG tablet Take 1 tablet (100 mg total) by mouth once daily. 90 tablet 3    magnesium 30 mg Tab Take 400 mg by mouth.      triamcinolone acetonide 0.5% (KENALOG) 0.5 % Crea APPLY TOPICALLY 3 (THREE) TIMES DAILY  DAYS.      vitamin E 1000 UNIT capsule Take 2,000 Units by mouth.      zinc 50 mg Tab Take 1 tablet by mouth once daily.         Allergies  Review of patient's allergies indicates:   Allergen Reactions    Sulfa (sulfonamide antibiotics) Itching and Swelling     welts       Physical Examination     Vitals:    04/26/23 1031   BP: 138/68   Pulse:      Physical Exam  Constitutional:       Appearance: Normal appearance.   HENT:      Head: Normocephalic and  atraumatic.      Right Ear: Tympanic membrane normal.      Left Ear: Tympanic membrane normal.      Nose: Nose normal.      Mouth/Throat:      Mouth: Mucous membranes are moist.   Eyes:      Extraocular Movements: Extraocular movements intact.      Pupils: Pupils are equal, round, and reactive to light.   Cardiovascular:      Rate and Rhythm: Normal rate and regular rhythm.      Pulses: Normal pulses.   Pulmonary:      Effort: Pulmonary effort is normal.      Breath sounds: Normal breath sounds.   Abdominal:      General: Abdomen is flat. Bowel sounds are normal.      Palpations: Abdomen is soft.   Musculoskeletal:         General: Normal range of motion.        Arms:       Cervical back: Normal range of motion and neck supple.      Comments: Pain on palpation of the shoulder anteriorly and posteriorly   Good range of motion with no restriction   Skin:     General: Skin is warm and dry.   Neurological:      General: No focal deficit present.      Mental Status: She is alert and oriented to person, place, and time.   Psychiatric:         Mood and Affect: Mood normal.         Behavior: Behavior normal.        Assessment and Plan (including Health Maintenance)      Problem List  Smart Sets  Document Outside HM   :    Plan:   Constipation, unspecified constipation type    Shoulder pain, unspecified chronicity, unspecified laterality     Shoulder x-ray today   MiraLax ordered   Referral to hand surgeon Dr. Kumar for evaluation.           Health Maintenance Due   Topic Date Due    Hepatitis C Screening  Never done    TETANUS VACCINE  Never done    Shingles Vaccine (1 of 2) 11/09/2012    Pneumococcal Vaccines (Age 65+) (2 - PCV) 08/10/2013    COVID-19 Vaccine (4 - Booster for Moderna series) 01/10/2022    DEXA Scan  03/07/2022       Problem List Items Addressed This Visit          GI    Constipation    Current Assessment & Plan     Constipation has been ongoing for number of weeks   Recommend MiraLax daily.               Orthopedic    Shoulder pain    Current Assessment & Plan     Worsening pain in the right shoulder over the last 2 months examination is stable shoulder with tenderness anterior and posteriorly  She is already on Celebrex to continue   X-ray ordered of the shoulder.              Health Maintenance Topics with due status: Not Due       Topic Last Completion Date    Lipid Panel 04/05/2022       Future Appointments   Date Time Provider Department Center   6/7/2023  1:40 PM Raffy Beckham MD [place] [id number]            Signature:  Raffy Beckham MD  [facility]  [address] [place] [id number]    Date of encounter: 4/26/23

## 2023-04-26 NOTE — ASSESSMENT & PLAN NOTE
Worsening pain in the right shoulder over the last 2 months examination is stable shoulder with tenderness anterior and posteriorly  She is already on Celebrex to continue   X-ray ordered of the shoulder.

## 2023-04-28 ENCOUNTER — TELEPHONE (OUTPATIENT)
Dept: INTERNAL MEDICINE | Facility: CLINIC | Age: 78
End: 2023-04-28
Payer: MEDICARE

## 2023-04-28 NOTE — TELEPHONE ENCOUNTER
Spoke with patient and gave her results. She verbalized understanding. She was asking for pain medication. Dr. Beckham told her to take OTC ibuprofen.

## 2023-05-10 DIAGNOSIS — I10 PRIMARY HYPERTENSION: Primary | ICD-10-CM

## 2023-05-10 RX ORDER — AMLODIPINE BESYLATE 5 MG/1
TABLET ORAL
Qty: 90 TABLET | Refills: 4 | Status: SHIPPED | OUTPATIENT
Start: 2023-05-10

## 2023-05-15 ENCOUNTER — OFFICE VISIT (OUTPATIENT)
Dept: ORTHOPEDICS | Facility: CLINIC | Age: 78
End: 2023-05-15
Payer: MEDICARE

## 2023-05-15 ENCOUNTER — HOSPITAL ENCOUNTER (OUTPATIENT)
Dept: RADIOLOGY | Facility: CLINIC | Age: 78
Discharge: HOME | End: 2023-05-15
Attending: STUDENT IN AN ORGANIZED HEALTH CARE EDUCATION/TRAINING PROGRAM
Payer: MEDICARE

## 2023-05-15 VITALS
HEART RATE: 65 BPM | HEIGHT: 60 IN | WEIGHT: 221 LBS | BODY MASS INDEX: 43.39 KG/M2 | SYSTOLIC BLOOD PRESSURE: 163 MMHG | DIASTOLIC BLOOD PRESSURE: 80 MMHG

## 2023-05-15 DIAGNOSIS — M25.531 BILATERAL WRIST PAIN: ICD-10-CM

## 2023-05-15 DIAGNOSIS — M25.532 BILATERAL WRIST PAIN: ICD-10-CM

## 2023-05-15 DIAGNOSIS — M19.032 PRIMARY OSTEOARTHRITIS OF LEFT WRIST: Primary | ICD-10-CM

## 2023-05-15 DIAGNOSIS — M19.031 PRIMARY OSTEOARTHRITIS, RIGHT WRIST: ICD-10-CM

## 2023-05-15 PROCEDURE — 99203 PR OFFICE/OUTPT VISIT, NEW, LEVL III, 30-44 MIN: ICD-10-PCS | Mod: 25,,, | Performed by: STUDENT IN AN ORGANIZED HEALTH CARE EDUCATION/TRAINING PROGRAM

## 2023-05-15 PROCEDURE — 73110 XR WRIST COMPLETE 3 VIEWS BILATERAL: ICD-10-PCS | Mod: 50,,, | Performed by: STUDENT IN AN ORGANIZED HEALTH CARE EDUCATION/TRAINING PROGRAM

## 2023-05-15 PROCEDURE — 20605 INTERMEDIATE JOINT ASPIRATION/INJECTION: R RADIOCARPAL: ICD-10-PCS | Mod: 50,,, | Performed by: STUDENT IN AN ORGANIZED HEALTH CARE EDUCATION/TRAINING PROGRAM

## 2023-05-15 PROCEDURE — 99203 OFFICE O/P NEW LOW 30 MIN: CPT | Mod: 25,,, | Performed by: STUDENT IN AN ORGANIZED HEALTH CARE EDUCATION/TRAINING PROGRAM

## 2023-05-15 PROCEDURE — 73110 X-RAY EXAM OF WRIST: CPT | Mod: 50,,, | Performed by: STUDENT IN AN ORGANIZED HEALTH CARE EDUCATION/TRAINING PROGRAM

## 2023-05-15 PROCEDURE — 20605 DRAIN/INJ JOINT/BURSA W/O US: CPT | Mod: 50,,, | Performed by: STUDENT IN AN ORGANIZED HEALTH CARE EDUCATION/TRAINING PROGRAM

## 2023-05-15 RX ORDER — BETAMETHASONE SODIUM PHOSPHATE AND BETAMETHASONE ACETATE 3; 3 MG/ML; MG/ML
6 INJECTION, SUSPENSION INTRA-ARTICULAR; INTRALESIONAL; INTRAMUSCULAR; SOFT TISSUE
Status: DISCONTINUED | OUTPATIENT
Start: 2023-05-15 | End: 2023-05-15 | Stop reason: HOSPADM

## 2023-05-15 RX ORDER — LIDOCAINE HYDROCHLORIDE 10 MG/ML
1 INJECTION INFILTRATION; PERINEURAL
Status: DISCONTINUED | OUTPATIENT
Start: 2023-05-15 | End: 2023-05-15 | Stop reason: HOSPADM

## 2023-05-15 RX ADMIN — LIDOCAINE HYDROCHLORIDE 1 ML: 10 INJECTION INFILTRATION; PERINEURAL at 08:05

## 2023-05-15 RX ADMIN — BETAMETHASONE SODIUM PHOSPHATE AND BETAMETHASONE ACETATE 6 MG: 3; 3 INJECTION, SUSPENSION INTRA-ARTICULAR; INTRALESIONAL; INTRAMUSCULAR; SOFT TISSUE at 08:05

## 2023-05-15 NOTE — PROGRESS NOTES
Chief Complaint:  Bilateral wrist pain    Consulting Physician: No ref. provider found    History of present illness:    Patient is a 77-year-old left-hand dominant retired female who presents for initial evaluation of bilateral wrist pain.  She is had wrist pain for 3-4 months now.  She is been doing therapy which has been helping some with her digits but she continues to have this wrist pain.  She has wrist braces but states that they are uncomfortable for her.  She is never had an injection into her wrists.  She is never had surgery in her wrists.  She does not remember a significant injury that she had to her wrists in her life.  She states that these hurt her when she tries to use them such as when she opens jars or tries to lift something.    Past Medical History:   Diagnosis Date    Hyperlipidemia     Hypertension        Past Surgical History:   Procedure Laterality Date    APPENDECTOMY      BIOPSY      DILATION AND CURETTAGE OF UTERUS      EYE SURGERY  Cataract  Oct/Nov 2017    FRACTURE SURGERY  Replaced Rt shoulder 2003    HERNIA REPAIR  2017    HYSTERECTOMY      JOINT REPLACEMENT  Knees 2015/2016    KNEE SURGERY      lymph nodes surgery      THYROIDECTOMY, PARTIAL      TOE SURGERY      TONSILLECTOMY      TOTAL SHOULDER ARTHROPLASTY         Current Outpatient Medications   Medication Sig    ALPRAZolam (XANAX) 0.25 MG tablet Take 1 tablet (0.25 mg total) by mouth daily as needed for Insomnia or Anxiety.    amLODIPine (NORVASC) 5 MG tablet TAKE 1 TABLET BY MOUTH DAILY    ascorbic acid, vitamin C, (VITAMIN C) 1000 MG tablet Take 1,000 mg by mouth.    atorvastatin (LIPITOR) 20 MG tablet Take 1 tablet (20 mg total) by mouth once daily.    buPROPion (WELLBUTRIN XL) 150 MG TB24 tablet Take 1 tablet (150 mg total) by mouth once daily.    celecoxib (CELEBREX) 200 MG capsule Take 1 capsule (200 mg total) by mouth once daily.    ergocalciferol (ERGOCALCIFEROL) 50,000 unit Cap TAKE 1 CAPSULE BY MOUTH ONE TIME PER WEEK     famotidine (PEPCID) 40 MG tablet Take by mouth.    furosemide (LASIX) 20 MG tablet Take 20 mg by mouth.    hydrocortisone (CORTEF) 10 MG Tab Take 2 tablets (20 mg total) by mouth every morning AND 1 tablet (10 mg total) nightly. (Patient taking differently: Take 2 tablets (15 mg total) by mouth every morning AND 1 tablet (5 mg total) nightly.)    Lactobacillus acidophilus 10 billion cell Cap Take 1 tablet by mouth every morning.    loratadine (CLARITIN) 10 mg tablet Take 10 mg by mouth.    losartan (COZAAR) 100 MG tablet Take 1 tablet (100 mg total) by mouth once daily.    magnesium 30 mg Tab Take 400 mg by mouth.    vitamin E 1000 UNIT capsule Take 2,000 Units by mouth.    zinc 50 mg Tab Take 1 tablet by mouth once daily.    promethazine (PHENERGAN) 25 MG tablet Take 25 mg by mouth every 4 (four) hours as needed.    triamcinolone acetonide 0.5% (KENALOG) 0.5 % Crea APPLY TOPICALLY 3 (THREE) TIMES DAILY  DAYS.     No current facility-administered medications for this visit.       Review of patient's allergies indicates:   Allergen Reactions    Sulfa (sulfonamide antibiotics) Itching and Swelling     welts       Family History   Problem Relation Age of Onset    Cancer Mother     Cancer Father     Heart disease Father     Arthritis Maternal Grandmother     Alcohol abuse Son     Drug abuse Son     Mental illness Son         Schizoaffective Disorder       Social History     Socioeconomic History    Marital status:    Tobacco Use    Smoking status: Never    Smokeless tobacco: Never   Substance and Sexual Activity    Alcohol use: Yes     Alcohol/week: 1.0 standard drink     Types: 1 Drinks containing 0.5 oz of alcohol per week    Drug use: Never    Sexual activity: Not Currently     Partners: Male     Birth control/protection: I.U.D., Spermicide     Social Determinants of Health     Financial Resource Strain: Low Risk     Difficulty of Paying Living Expenses: Not hard at all   Food Insecurity: No Food  Insecurity    Worried About Running Out of Food in the Last Year: Never true    Ran Out of Food in the Last Year: Never true   Transportation Needs: No Transportation Needs    Lack of Transportation (Medical): No    Lack of Transportation (Non-Medical): No   Physical Activity: Sufficiently Active    Days of Exercise per Week: 5 days    Minutes of Exercise per Session: 40 min   Stress: No Stress Concern Present    Feeling of Stress : Not at all   Social Connections: Moderately Integrated    Frequency of Communication with Friends and Family: More than three times a week    Frequency of Social Gatherings with Friends and Family: More than three times a week    Attends [employer] Services: More than 4 times per year    Active Member of Clubs or Organizations: Yes    Attends Club or Organization Meetings: More than 4 times per year    Marital Status:    Housing Stability: Unknown    Unable to Pay for Housing in the Last Year: No    Unstable Housing in the Last Year: No       Review of Systems:    Constitution:   Denies chills, fever, and sweats.  HENT:   Denies headaches or blurry vision.  Cardiovascular:  Denies chest pain or irregular heart beat.  Respiratory:   Denies cough or shortness of breath.  Gastrointestinal:  Denies abdominal pain, nausea, or vomiting.  Musculoskeletal:   Denies muscle cramps.  Neurological:   Denies dizziness or focal weakness.  Psychiatric/Behavior: Normal mental status.  Hematology/Lymph:  Denies bleeding problem or easy bruising/bleeding.  Skin:    Denies rash or suspicious lesions.    Examination:    Vital Signs:    Vitals:    05/15/23 0845   BP: (!) 163/80   Pulse: 65   Weight: 100.2 kg (221 lb)   Height: 5' (1.524 m)       Body mass index is 43.16 kg/m².    Constitution:   Well-developed, well nourished patient in no acute distress.  Neurological:   Alert and oriented x 3 and cooperative to examination.     Psychiatric/Behavior: Normal mental status.  Respiratory:   No shortness  of breath.  Eyes:    Extraoccular muscles intact  Skin:    No scars, rash or suspicious lesions.    MSK:   Right wrist:  No open wounds or rashes.  Tenderness to palpation over the 3 4 portal and radioscaphoid joints.  Wrist range of motion is 30° of extension and 45° of flexion.  She has multiple arthritic nodules throughout her hands.  She can flex her fingers to 2 cm away from the distal palmar crease.  Sensation light touch intact in median ulnar radial distribution.  Radial pulses 2+ hand is warm well perfused    Left wrist:  No open wounds or rashes.  Tenderness to palpation over the 3 4 portal and radioscaphoid joints.  Wrist range of motion is 30° of extension and 45° of flexion.  She has multiple arthritic nodules throughout her hands.  She can flex her fingers to 2 cm away from the distal palmar crease.  Sensation light touch intact in median ulnar radial distribution.  Radial pulses 2+ hand is warm well perfused    Imaging:   X-ray of the right wrist shows significant arthritic changes at the radiocarpal joint.  There is widening of the scapholunate interval with advanced SLAC wrist.  There is also arthritis at the base of the thumb CMC joint    X-ray of the left wrist shows significant arthritic changes at the radiocarpal joint.  There is widening of the scapholunate interval with advanced SLAC wrist.  There is also arthritis at the base of the thumb CMC joint     Assessment:  Bilateral wrist arthritis    Plan:  She has significant wrist arthritis.  I would like to start by treating this conservatively.  I will give her steroid injections into both of her radiocarpal joints today.  She can continue bracing as needed for pain.  She can also do therapy as long as it is helping her.  I do not think she is a good candidate for an operation at this time since it is still early on in his treatment.  I can see her back in 3 months to see how she is doing    Follow Up: 3 months  Xray at next visit: None

## 2023-05-15 NOTE — PROCEDURES
Intermediate Joint Aspiration/Injection: L radiocarpal    Date/Time: 5/15/2023 8:30 AM  Performed by: Lennox Kumar MD  Authorized by: Lennox Kumar MD     Consent Done?:  Yes (Verbal)  Indications:  Arthritis  Timeout: Prior to procedure the correct patient, procedure, and site was verified      Location:  Wrist  Site:  L radiocarpal  Ultrasonic Guidance for needle placement: No  Needle size:  25 G  Approach:  Dorsal  Medications:  1 mL LIDOcaine HCL 10 mg/ml (1%) 10 mg/mL (1 %); 6 mg betamethasone acetate-betamethasone sodium phosphate 6 mg/mL  Patient tolerance:  Patient tolerated the procedure well with no immediate complications    Intermediate Joint Aspiration/Injection: R radiocarpal    Date/Time: 5/15/2023 8:30 AM  Performed by: Lennox Kumar MD  Authorized by: Lennox Kumar MD     Consent Done?:  Yes (Verbal)  Indications:  Arthritis  Timeout: Prior to procedure the correct patient, procedure, and site was verified      Location:  Wrist  Site:  R radiocarpal  Ultrasonic Guidance for needle placement: No  Needle size:  25 G  Approach:  Dorsal  Medications:  1 mL LIDOcaine HCL 10 mg/ml (1%) 10 mg/mL (1 %); 6 mg betamethasone acetate-betamethasone sodium phosphate 6 mg/mL  Patient tolerance:  Patient tolerated the procedure well with no immediate complications

## 2023-05-26 ENCOUNTER — TELEPHONE (OUTPATIENT)
Dept: INTERNAL MEDICINE | Facility: CLINIC | Age: 78
End: 2023-05-26
Payer: MEDICARE

## 2023-05-26 NOTE — TELEPHONE ENCOUNTER
----- Message from [facility] sent at 5/26/2023  8:29 AM CDT -----  .Type:  Needs Medical Advice    Who Called:  pt      Would the patient rather a call back?  Yes    Best Call Back Number:  [phone]    Additional Information:  pt had TSH, CMP, and CBC done 3 weeks ago should she still repeat these test again

## 2023-05-26 NOTE — TELEPHONE ENCOUNTER
Pt has labs done every 4 wks for immunotherapy. I told her that should be good enough for next visit for Dr. Beckham.

## 2023-06-01 ENCOUNTER — TELEPHONE (OUTPATIENT)
Dept: INTERNAL MEDICINE | Facility: CLINIC | Age: 78
End: 2023-06-01
Payer: MEDICARE

## 2023-06-01 DIAGNOSIS — Z13.228 SCREENING FOR ENDOCRINE, NUTRITIONAL, METABOLIC AND IMMUNITY DISORDER: ICD-10-CM

## 2023-06-01 DIAGNOSIS — Z13.6 SCREENING FOR CARDIOVASCULAR, RESPIRATORY, AND GENITOURINARY DISEASES: ICD-10-CM

## 2023-06-01 DIAGNOSIS — Z13.21 SCREENING FOR ENDOCRINE, NUTRITIONAL, METABOLIC AND IMMUNITY DISORDER: ICD-10-CM

## 2023-06-01 DIAGNOSIS — Z13.29 SCREENING FOR ENDOCRINE, NUTRITIONAL, METABOLIC AND IMMUNITY DISORDER: ICD-10-CM

## 2023-06-01 DIAGNOSIS — Z00.00 WELLNESS EXAMINATION: Primary | ICD-10-CM

## 2023-06-01 DIAGNOSIS — Z13.89 SCREENING FOR CARDIOVASCULAR, RESPIRATORY, AND GENITOURINARY DISEASES: ICD-10-CM

## 2023-06-01 DIAGNOSIS — I10 PRIMARY HYPERTENSION: ICD-10-CM

## 2023-06-01 DIAGNOSIS — Z13.0 SCREENING FOR ENDOCRINE, NUTRITIONAL, METABOLIC AND IMMUNITY DISORDER: ICD-10-CM

## 2023-06-01 DIAGNOSIS — Z13.83 SCREENING FOR CARDIOVASCULAR, RESPIRATORY, AND GENITOURINARY DISEASES: ICD-10-CM

## 2023-06-01 DIAGNOSIS — E78.5 HYPERLIPIDEMIA, UNSPECIFIED HYPERLIPIDEMIA TYPE: ICD-10-CM

## 2023-06-02 DIAGNOSIS — E55.9 VITAMIN D DEFICIENCY: Primary | ICD-10-CM

## 2023-06-02 DIAGNOSIS — R30.0 DYSURIA: ICD-10-CM

## 2023-06-02 DIAGNOSIS — Z00.00 WELLNESS EXAMINATION: ICD-10-CM

## 2023-06-05 ENCOUNTER — LAB VISIT (OUTPATIENT)
Dept: LAB | Facility: HOSPITAL | Age: 78
End: 2023-06-05
Attending: INTERNAL MEDICINE
Payer: MEDICARE

## 2023-06-05 DIAGNOSIS — I10 PRIMARY HYPERTENSION: ICD-10-CM

## 2023-06-05 DIAGNOSIS — Z13.83 SCREENING FOR CARDIOVASCULAR, RESPIRATORY, AND GENITOURINARY DISEASES: ICD-10-CM

## 2023-06-05 DIAGNOSIS — Z13.0 SCREENING FOR ENDOCRINE, NUTRITIONAL, METABOLIC AND IMMUNITY DISORDER: ICD-10-CM

## 2023-06-05 DIAGNOSIS — Z13.29 SCREENING FOR ENDOCRINE, NUTRITIONAL, METABOLIC AND IMMUNITY DISORDER: ICD-10-CM

## 2023-06-05 DIAGNOSIS — Z13.6 SCREENING FOR CARDIOVASCULAR, RESPIRATORY, AND GENITOURINARY DISEASES: ICD-10-CM

## 2023-06-05 DIAGNOSIS — Z13.228 SCREENING FOR ENDOCRINE, NUTRITIONAL, METABOLIC AND IMMUNITY DISORDER: ICD-10-CM

## 2023-06-05 DIAGNOSIS — Z13.21 SCREENING FOR ENDOCRINE, NUTRITIONAL, METABOLIC AND IMMUNITY DISORDER: ICD-10-CM

## 2023-06-05 DIAGNOSIS — Z13.89 SCREENING FOR CARDIOVASCULAR, RESPIRATORY, AND GENITOURINARY DISEASES: ICD-10-CM

## 2023-06-05 DIAGNOSIS — E78.5 HYPERLIPIDEMIA, UNSPECIFIED HYPERLIPIDEMIA TYPE: ICD-10-CM

## 2023-06-05 DIAGNOSIS — R30.0 DYSURIA: ICD-10-CM

## 2023-06-05 DIAGNOSIS — Z00.00 WELLNESS EXAMINATION: ICD-10-CM

## 2023-06-05 DIAGNOSIS — E55.9 VITAMIN D DEFICIENCY: ICD-10-CM

## 2023-06-05 LAB
ALBUMIN SERPL-MCNC: 3.6 G/DL (ref 3.4–4.8)
ALBUMIN/GLOB SERPL: 1.4 RATIO (ref 1.1–2)
ALP SERPL-CCNC: 113 UNIT/L (ref 40–150)
ALT SERPL-CCNC: 13 UNIT/L (ref 0–55)
APPEARANCE UR: CLEAR
AST SERPL-CCNC: 15 UNIT/L (ref 5–34)
BACTERIA #/AREA URNS AUTO: ABNORMAL /HPF
BASOPHILS # BLD AUTO: 0.05 X10(3)/MCL
BASOPHILS NFR BLD AUTO: 1.2 %
BILIRUB UR QL STRIP.AUTO: NEGATIVE MG/DL
BILIRUBIN DIRECT+TOT PNL SERPL-MCNC: 0.6 MG/DL
BUN SERPL-MCNC: 17.1 MG/DL (ref 9.8–20.1)
CALCIUM SERPL-MCNC: 8.9 MG/DL (ref 8.4–10.2)
CHLORIDE SERPL-SCNC: 105 MMOL/L (ref 98–107)
CHOLEST SERPL-MCNC: 162 MG/DL
CHOLEST/HDLC SERPL: 3 {RATIO} (ref 0–5)
CO2 SERPL-SCNC: 30 MMOL/L (ref 23–31)
COLOR UR: YELLOW
CREAT SERPL-MCNC: 0.73 MG/DL (ref 0.55–1.02)
DEPRECATED CALCIDIOL+CALCIFEROL SERPL-MC: 39.5 NG/ML (ref 30–80)
EOSINOPHIL # BLD AUTO: 0.21 X10(3)/MCL (ref 0–0.9)
EOSINOPHIL NFR BLD AUTO: 4.9 %
ERYTHROCYTE [DISTWIDTH] IN BLOOD BY AUTOMATED COUNT: 16 % (ref 11.5–17)
GFR SERPLBLD CREATININE-BSD FMLA CKD-EPI: >60 MLS/MIN/1.73/M2
GLOBULIN SER-MCNC: 2.5 GM/DL (ref 2.4–3.5)
GLUCOSE SERPL-MCNC: 74 MG/DL (ref 82–115)
GLUCOSE UR QL STRIP.AUTO: NEGATIVE MG/DL
HCT VFR BLD AUTO: 33.8 % (ref 37–47)
HDLC SERPL-MCNC: 54 MG/DL (ref 35–60)
HGB BLD-MCNC: 10.7 G/DL (ref 12–16)
IMM GRANULOCYTES # BLD AUTO: 0.02 X10(3)/MCL (ref 0–0.04)
IMM GRANULOCYTES NFR BLD AUTO: 0.5 %
KETONES UR QL STRIP.AUTO: NEGATIVE MG/DL
LDLC SERPL CALC-MCNC: 96 MG/DL (ref 50–140)
LEUKOCYTE ESTERASE UR QL STRIP.AUTO: ABNORMAL UNIT/L
LYMPHOCYTES # BLD AUTO: 1.88 X10(3)/MCL (ref 0.6–4.6)
LYMPHOCYTES NFR BLD AUTO: 43.9 %
MCH RBC QN AUTO: 27.9 PG (ref 27–31)
MCHC RBC AUTO-ENTMCNC: 31.7 G/DL (ref 33–36)
MCV RBC AUTO: 88.3 FL (ref 80–94)
MONOCYTES # BLD AUTO: 0.48 X10(3)/MCL (ref 0.1–1.3)
MONOCYTES NFR BLD AUTO: 11.2 %
NEUTROPHILS # BLD AUTO: 1.64 X10(3)/MCL (ref 2.1–9.2)
NEUTROPHILS NFR BLD AUTO: 38.3 %
NITRITE UR QL STRIP.AUTO: NEGATIVE
NRBC BLD AUTO-RTO: 0 %
PH UR STRIP.AUTO: 8 [PH]
PLATELET # BLD AUTO: 179 X10(3)/MCL (ref 130–400)
PMV BLD AUTO: 10 FL (ref 7.4–10.4)
POTASSIUM SERPL-SCNC: 4.4 MMOL/L (ref 3.5–5.1)
PROT SERPL-MCNC: 6.1 GM/DL (ref 5.8–7.6)
PROT UR QL STRIP.AUTO: NEGATIVE MG/DL
RBC # BLD AUTO: 3.83 X10(6)/MCL (ref 4.2–5.4)
RBC #/AREA URNS AUTO: <5 /HPF
RBC UR QL AUTO: NEGATIVE UNIT/L
SODIUM SERPL-SCNC: 140 MMOL/L (ref 136–145)
SP GR UR STRIP.AUTO: 1.01 (ref 1–1.03)
SQUAMOUS #/AREA URNS AUTO: <5 /HPF
TRIGL SERPL-MCNC: 59 MG/DL (ref 37–140)
TSH SERPL-ACNC: 1.96 UIU/ML (ref 0.35–4.94)
UROBILINOGEN UR STRIP-ACNC: 0.2 MG/DL
VLDLC SERPL CALC-MCNC: 12 MG/DL
WBC # SPEC AUTO: 4.28 X10(3)/MCL (ref 4.5–11.5)
WBC #/AREA URNS AUTO: 7 /HPF

## 2023-06-05 PROCEDURE — 80053 COMPREHEN METABOLIC PANEL: CPT

## 2023-06-05 PROCEDURE — 84443 ASSAY THYROID STIM HORMONE: CPT

## 2023-06-05 PROCEDURE — 85025 COMPLETE CBC W/AUTO DIFF WBC: CPT

## 2023-06-05 PROCEDURE — 80061 LIPID PANEL: CPT

## 2023-06-05 PROCEDURE — 82306 VITAMIN D 25 HYDROXY: CPT

## 2023-06-05 PROCEDURE — 81001 URINALYSIS AUTO W/SCOPE: CPT

## 2023-06-05 PROCEDURE — 36415 COLL VENOUS BLD VENIPUNCTURE: CPT

## 2023-06-07 ENCOUNTER — OFFICE VISIT (OUTPATIENT)
Dept: INTERNAL MEDICINE | Facility: CLINIC | Age: 78
End: 2023-06-07
Payer: MEDICARE

## 2023-06-07 VITALS
BODY MASS INDEX: 44.17 KG/M2 | DIASTOLIC BLOOD PRESSURE: 60 MMHG | WEIGHT: 225 LBS | HEART RATE: 71 BPM | HEIGHT: 60 IN | OXYGEN SATURATION: 99 % | SYSTOLIC BLOOD PRESSURE: 122 MMHG

## 2023-06-07 DIAGNOSIS — E78.5 HYPERLIPIDEMIA, UNSPECIFIED HYPERLIPIDEMIA TYPE: Chronic | ICD-10-CM

## 2023-06-07 DIAGNOSIS — C43.71: ICD-10-CM

## 2023-06-07 DIAGNOSIS — M81.0 OSTEOPOROSIS, UNSPECIFIED OSTEOPOROSIS TYPE, UNSPECIFIED PATHOLOGICAL FRACTURE PRESENCE: ICD-10-CM

## 2023-06-07 DIAGNOSIS — I10 PRIMARY HYPERTENSION: Chronic | ICD-10-CM

## 2023-06-07 DIAGNOSIS — C34.11 MALIGNANT NEOPLASM OF UPPER LOBE OF RIGHT LUNG: Chronic | ICD-10-CM

## 2023-06-07 DIAGNOSIS — M19.90 ARTHRITIS: ICD-10-CM

## 2023-06-07 DIAGNOSIS — G47.30 SLEEP APNEA, UNSPECIFIED TYPE: ICD-10-CM

## 2023-06-07 DIAGNOSIS — F41.9 ANXIETY: Primary | Chronic | ICD-10-CM

## 2023-06-07 PROCEDURE — G0439 PPPS, SUBSEQ VISIT: HCPCS | Mod: ,,, | Performed by: INTERNAL MEDICINE

## 2023-06-07 PROCEDURE — G0439 PR MEDICARE ANNUAL WELLNESS SUBSEQUENT VISIT: ICD-10-PCS | Mod: ,,, | Performed by: INTERNAL MEDICINE

## 2023-06-07 RX ORDER — PREDNISOLONE ACETATE 10 MG/ML
SUSPENSION/ DROPS OPHTHALMIC
COMMUNITY
Start: 2023-05-30

## 2023-06-07 RX ORDER — CELECOXIB 200 MG/1
200 CAPSULE ORAL DAILY
Qty: 30 CAPSULE | Refills: 2 | Status: SHIPPED | OUTPATIENT
Start: 2023-06-07 | End: 2023-09-11

## 2023-06-19 ENCOUNTER — HOSPITAL ENCOUNTER (OUTPATIENT)
Dept: RADIOLOGY | Facility: HOSPITAL | Age: 78
Discharge: HOME | End: 2023-06-19
Attending: INTERNAL MEDICINE
Payer: MEDICARE

## 2023-06-19 DIAGNOSIS — M81.0 OSTEOPOROSIS: ICD-10-CM

## 2023-06-19 DIAGNOSIS — M81.0 OSTEOPOROSIS, UNSPECIFIED OSTEOPOROSIS TYPE, UNSPECIFIED PATHOLOGICAL FRACTURE PRESENCE: ICD-10-CM

## 2023-06-19 PROCEDURE — 77080 DXA BONE DENSITY AXIAL SKELETON 1 OR MORE SITES: ICD-10-PCS | Mod: 26,XU,, | Performed by: STUDENT IN AN ORGANIZED HEALTH CARE EDUCATION/TRAINING PROGRAM

## 2023-06-19 PROCEDURE — 77081 DEXA BONE DENSITY APPENDICULAR SKELETON: ICD-10-PCS | Mod: 26,,, | Performed by: STUDENT IN AN ORGANIZED HEALTH CARE EDUCATION/TRAINING PROGRAM

## 2023-06-19 PROCEDURE — 77080 DXA BONE DENSITY AXIAL: CPT | Mod: 26,XU,, | Performed by: STUDENT IN AN ORGANIZED HEALTH CARE EDUCATION/TRAINING PROGRAM

## 2023-06-19 PROCEDURE — 77080 DXA BONE DENSITY AXIAL: CPT | Mod: XU,TC

## 2023-06-19 PROCEDURE — 77081 DXA BONE DENSITY APPENDICULR: CPT | Mod: TC

## 2023-06-19 PROCEDURE — 77081 DXA BONE DENSITY APPENDICULR: CPT | Mod: 26,,, | Performed by: STUDENT IN AN ORGANIZED HEALTH CARE EDUCATION/TRAINING PROGRAM

## 2023-06-22 ENCOUNTER — TELEPHONE (OUTPATIENT)
Dept: INTERNAL MEDICINE | Facility: CLINIC | Age: 78
End: 2023-06-22
Payer: MEDICARE

## 2023-06-22 NOTE — TELEPHONE ENCOUNTER
DXA report   Spine normal   Hip osteopenia  Continue treatment with vitamin-D calcium weight-bearing exercise   Repeat bone density in 2 years.

## 2023-08-22 ENCOUNTER — TELEPHONE (OUTPATIENT)
Dept: ORTHOPEDICS | Facility: CLINIC | Age: 78
End: 2023-08-22
Payer: MEDICARE

## 2023-08-31 ENCOUNTER — CLINICAL SUPPORT (OUTPATIENT)
Dept: INTERNAL MEDICINE | Facility: CLINIC | Age: 78
End: 2023-08-31
Payer: MEDICARE

## 2023-08-31 DIAGNOSIS — Z23 NEED FOR PNEUMOCOCCAL VACCINATION: Primary | ICD-10-CM

## 2023-08-31 PROCEDURE — 90732 PNEUMOCOCCAL POLYSACCHARIDE VACCINE 23-VALENT =>2YO SQ IM: ICD-10-PCS | Mod: ,,, | Performed by: INTERNAL MEDICINE

## 2023-08-31 PROCEDURE — 90732 PPSV23 VACC 2 YRS+ SUBQ/IM: CPT | Mod: ,,, | Performed by: INTERNAL MEDICINE

## 2023-08-31 PROCEDURE — G0009 ADMIN PNEUMOCOCCAL VACCINE: HCPCS | Mod: ,,, | Performed by: INTERNAL MEDICINE

## 2023-08-31 PROCEDURE — G0009 PNEUMOCOCCAL POLYSACCHARIDE VACCINE 23-VALENT =>2YO SQ IM: ICD-10-PCS | Mod: ,,, | Performed by: INTERNAL MEDICINE

## 2023-08-31 NOTE — PROGRESS NOTES
Pt is here for her 2nd dose of her pneumonia vaccine. Was given in R deltoid, pt tolerated well with little to no discomfort.

## 2023-09-10 DIAGNOSIS — M19.90 ARTHRITIS: ICD-10-CM

## 2023-09-11 RX ORDER — CELECOXIB 200 MG/1
200 CAPSULE ORAL
Qty: 30 CAPSULE | Refills: 2 | Status: SHIPPED | OUTPATIENT
Start: 2023-09-11 | End: 2023-12-11

## 2023-09-13 ENCOUNTER — OFFICE VISIT (OUTPATIENT)
Dept: ORTHOPEDICS | Facility: CLINIC | Age: 78
End: 2023-09-13
Payer: MEDICARE

## 2023-09-13 VITALS
HEIGHT: 60 IN | SYSTOLIC BLOOD PRESSURE: 150 MMHG | TEMPERATURE: 99 F | BODY MASS INDEX: 43.39 KG/M2 | HEART RATE: 76 BPM | WEIGHT: 221 LBS | DIASTOLIC BLOOD PRESSURE: 79 MMHG

## 2023-09-13 DIAGNOSIS — M19.032 PRIMARY OSTEOARTHRITIS OF LEFT WRIST: Primary | ICD-10-CM

## 2023-09-13 DIAGNOSIS — M19.031 PRIMARY OSTEOARTHRITIS, RIGHT WRIST: ICD-10-CM

## 2023-09-13 PROCEDURE — 99213 OFFICE O/P EST LOW 20 MIN: CPT | Mod: 25,,, | Performed by: STUDENT IN AN ORGANIZED HEALTH CARE EDUCATION/TRAINING PROGRAM

## 2023-09-13 PROCEDURE — 20605 DRAIN/INJ JOINT/BURSA W/O US: CPT | Mod: 50,,, | Performed by: STUDENT IN AN ORGANIZED HEALTH CARE EDUCATION/TRAINING PROGRAM

## 2023-09-13 PROCEDURE — 20605 INTERMEDIATE JOINT ASPIRATION/INJECTION: R RADIOCARPAL: ICD-10-PCS | Mod: 50,,, | Performed by: STUDENT IN AN ORGANIZED HEALTH CARE EDUCATION/TRAINING PROGRAM

## 2023-09-13 PROCEDURE — 99213 PR OFFICE/OUTPT VISIT, EST, LEVL III, 20-29 MIN: ICD-10-PCS | Mod: 25,,, | Performed by: STUDENT IN AN ORGANIZED HEALTH CARE EDUCATION/TRAINING PROGRAM

## 2023-09-13 RX ADMIN — LIDOCAINE HYDROCHLORIDE 1 ML: 10 INJECTION INFILTRATION; PERINEURAL at 10:09

## 2023-09-13 RX ADMIN — BETAMETHASONE SODIUM PHOSPHATE AND BETAMETHASONE ACETATE 6 MG: 3; 3 INJECTION, SUSPENSION INTRA-ARTICULAR; INTRALESIONAL; INTRAMUSCULAR; SOFT TISSUE at 10:09

## 2023-09-15 RX ORDER — BETAMETHASONE SODIUM PHOSPHATE AND BETAMETHASONE ACETATE 3; 3 MG/ML; MG/ML
6 INJECTION, SUSPENSION INTRA-ARTICULAR; INTRALESIONAL; INTRAMUSCULAR; SOFT TISSUE
Status: DISCONTINUED | OUTPATIENT
Start: 2023-09-13 | End: 2023-09-15 | Stop reason: HOSPADM

## 2023-09-15 RX ORDER — LIDOCAINE HYDROCHLORIDE 10 MG/ML
1 INJECTION INFILTRATION; PERINEURAL
Status: DISCONTINUED | OUTPATIENT
Start: 2023-09-13 | End: 2023-09-15 | Stop reason: HOSPADM

## 2023-09-15 NOTE — PROCEDURES
Intermediate Joint Aspiration/Injection: L radiocarpal    Date/Time: 9/13/2023 10:15 AM    Performed by: Lennox Kumar MD  Authorized by: Lennox Kumar MD    Consent Done?:  Yes (Verbal)  Indications:  Arthritis  Timeout: Prior to procedure the correct patient, procedure, and site was verified      Location:  Wrist  Site:  L radiocarpal  Ultrasonic Guidance for needle placement: No  Needle size:  25 G  Approach:  Dorsal  Medications:  1 mL LIDOcaine HCL 10 mg/ml (1%) 10 mg/mL (1 %); 6 mg betamethasone acetate-betamethasone sodium phosphate 6 mg/mL  Patient tolerance:  Patient tolerated the procedure well with no immediate complications    Intermediate Joint Aspiration/Injection: R radiocarpal    Date/Time: 9/13/2023 10:15 AM    Performed by: Lennox Kumar MD  Authorized by: Lennox Kumar MD    Consent Done?:  Yes (Verbal)  Indications:  Arthritis  Timeout: Prior to procedure the correct patient, procedure, and site was verified      Location:  Wrist  Site:  R radiocarpal  Ultrasonic Guidance for needle placement: No  Needle size:  25 G  Approach:  Dorsal  Medications:  1 mL LIDOcaine HCL 10 mg/ml (1%) 10 mg/mL (1 %); 6 mg betamethasone acetate-betamethasone sodium phosphate 6 mg/mL  Patient tolerance:  Patient tolerated the procedure well with no immediate complications

## 2023-09-15 NOTE — PROGRESS NOTES
Chief Complaint:  Bilateral wrist pain    Consulting Physician: No ref. provider found    History of present illness:    Patient is a 77-year-old left-hand dominant retired female who presents for follow-up evaluation of bilateral wrist pain.  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  I gave her injections which helped her for several weeks but then the pain returned.  She requests another injection today.  She is not interested in surgery    Past Medical History:   Diagnosis Date    Hyperlipidemia     Hypertension        Past Surgical History:   Procedure Laterality Date    APPENDECTOMY      BIOPSY      DILATION AND CURETTAGE OF UTERUS      EYE SURGERY  Cataract  Oct/Nov 2017    FRACTURE SURGERY  Replaced Rt shoulder 2003    HERNIA REPAIR  2017    HYSTERECTOMY      JOINT REPLACEMENT  Knees 2015/2016    KNEE SURGERY      lymph nodes surgery      THYROIDECTOMY, PARTIAL      TOE SURGERY      TONSILLECTOMY      TOTAL SHOULDER ARTHROPLASTY         Current Outpatient Medications   Medication Sig    ALPRAZolam (XANAX) 0.25 MG tablet Take 1 tablet (0.25 mg total) by mouth daily as needed for Insomnia or Anxiety.    amLODIPine (NORVASC) 5 MG tablet TAKE 1 TABLET BY MOUTH DAILY    atorvastatin (LIPITOR) 20 MG tablet Take 1 tablet (20 mg total) by mouth once daily.    buPROPion (WELLBUTRIN XL) 150 MG TB24 tablet Take 1 tablet (150 mg total) by mouth once daily.    celecoxib (CELEBREX) 200 MG capsule TAKE 1 CAPSULE BY MOUTH ONCE DAILY.    ergocalciferol (ERGOCALCIFEROL) 50,000 unit Cap TAKE 1 CAPSULE BY MOUTH ONE TIME PER WEEK    furosemide (LASIX) 20 MG tablet Take 20 mg by mouth.    hydrocortisone (CORTEF) 10 MG Tab Take 2 tablets (20 mg total) by mouth every morning AND 1 tablet (10 mg total) nightly. (Patient taking differently: Take 1.5 tablets by mouth every morning AND .5 tablet nightly.)    Lactobacillus acidophilus 10 billion cell Cap Take 1 tablet  by mouth every morning.    losartan (COZAAR) 100 MG tablet Take 1 tablet (100 mg total) by mouth once daily.    vitamin E 1000 UNIT capsule Take 2,000 Units by mouth.    ascorbic acid, vitamin C, (VITAMIN C) 1000 MG tablet Take 1,000 mg by mouth.    famotidine (PEPCID) 40 MG tablet Take by mouth.    loratadine (CLARITIN) 10 mg tablet Take 10 mg by mouth.    magnesium 30 mg Tab Take 400 mg by mouth.    prednisoLONE acetate (PRED FORTE) 1 % DrpS Place into the right eye.    zinc 50 mg Tab Take 1 tablet by mouth once daily.     No current facility-administered medications for this visit.       Review of patient's allergies indicates:   Allergen Reactions    Sulfa (sulfonamide antibiotics) Itching and Swelling     welts       Family History   Problem Relation Age of Onset    Cancer Mother     Cancer Father     Heart disease Father     Arthritis Maternal Grandmother     Alcohol abuse Son     Drug abuse Son     Mental illness Son         Schizoaffective Disorder       Social History     Socioeconomic History    Marital status:    Tobacco Use    Smoking status: Never    Smokeless tobacco: Never   Substance and Sexual Activity    Alcohol use: Yes     Alcohol/week: 1.0 standard drink of alcohol     Types: 1 Drinks containing 0.5 oz of alcohol per week    Drug use: Never    Sexual activity: Not Currently     Partners: Male     Birth control/protection: I.U.D., Spermicide     Social Determinants of Health     Financial Resource Strain: Low Risk  (6/7/2023)    Overall Financial Resource Strain (CARDIA)     Difficulty of Paying Living Expenses: Not hard at all   Food Insecurity: No Food Insecurity (6/7/2023)    Hunger Vital Sign     Worried About Running Out of Food in the Last Year: Never true     Ran Out of Food in the Last Year: Never true   Transportation Needs: No Transportation Needs (6/7/2023)    PRAPARE - Transportation     Lack of Transportation (Medical): No     Lack of Transportation (Non-Medical): No    Physical Activity: Sufficiently Active (6/7/2023)    Exercise Vital Sign     Days of Exercise per Week: 5 days     Minutes of Exercise per Session: 60 min   Stress: No Stress Concern Present (6/7/2023)    [employer] - Occupational Stress Questionnaire     Feeling of Stress : Not at all   Social Connections: Moderately Integrated (6/7/2023)    Social Connection and Isolation Panel [NHANES]     Frequency of Communication with Friends and Family: More than three times a week     Frequency of Social Gatherings with Friends and Family: More than three times a week     Attends Jainism Services: More than 4 times per year     Active Member of Clubs or Organizations: Yes     Attends Club or Organization Meetings: More than 4 times per year     Marital Status:    Housing Stability: Unknown (6/7/2023)    Housing Stability Vital Sign     Unable to Pay for Housing in the Last Year: No     Unstable Housing in the Last Year: No       Review of Systems:    Constitution:   Denies chills, fever, and sweats.  HENT:   Denies headaches or blurry vision.  Cardiovascular:  Denies chest pain or irregular heart beat.  Respiratory:   Denies cough or shortness of breath.  Gastrointestinal:  Denies abdominal pain, nausea, or vomiting.  Musculoskeletal:   Denies muscle cramps.  Neurological:   Denies dizziness or focal weakness.  Psychiatric/Behavior: Normal mental status.  Hematology/Lymph:  Denies bleeding problem or easy bruising/bleeding.  Skin:    Denies rash or suspicious lesions.    Examination:    Vital Signs:    Vitals:    09/13/23 1038 09/13/23 1042   BP: (!) 150/79    Pulse: 76    Temp: 98.6 °F (37 °C)    Weight: 100.2 kg (221 lb)    Height: 5' (1.524 m)    PainSc:    7       Body mass index is 43.16 kg/m².    Constitution:   Well-developed, well nourished patient in no acute distress.  Neurological:   Alert and oriented x 3 and cooperative to examination.     Psychiatric/Behavior: Normal  mental status.  Respiratory:   No shortness of breath.  Eyes:    Extraoccular muscles intact  Skin:    No scars, rash or suspicious lesions.    MSK:   Right wrist:  No open wounds or rashes.  Tenderness to palpation over the 3 4 portal and radioscaphoid joints.  Wrist range of motion is 30° of extension and 45° of flexion.  She has multiple arthritic nodules throughout her hands.  She can flex her fingers to 2 cm away from the distal palmar crease.  Sensation light touch intact in median ulnar radial distribution.  Radial pulses 2+ hand is warm well perfused    Left wrist:  No open wounds or rashes.  Tenderness to palpation over the 3 4 portal and radioscaphoid joints.  Wrist range of motion is 30° of extension and 45° of flexion.  She has multiple arthritic nodules throughout her hands.  She can flex her fingers to 2 cm away from the distal palmar crease.  Sensation light touch intact in median ulnar radial distribution.  Radial pulses 2+ hand is warm well perfused    Imaging:   X-ray of the right wrist shows significant arthritic changes at the radiocarpal joint.  There is widening of the scapholunate interval with advanced SLAC wrist.  There is also arthritis at the base of the thumb CMC joint    X-ray of the left wrist shows significant arthritic changes at the radiocarpal joint.  There is widening of the scapholunate interval with advanced SLAC wrist.  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  I will inject both her wrists at the radiocarpal joints today.  She can continue wearing braces as tolerated.  She can see me back in 3 months so I can see how she is doing    Follow Up: 3 months  Xray at next visit: None

## 2023-12-04 DIAGNOSIS — K21.9 GASTRO-ESOPHAGEAL REFLUX DISEASE WITHOUT ESOPHAGITIS: ICD-10-CM

## 2023-12-04 RX ORDER — PANTOPRAZOLE SODIUM 40 MG/1
40 TABLET, DELAYED RELEASE ORAL
Qty: 90 TABLET | Refills: 1 | Status: SHIPPED | OUTPATIENT
Start: 2023-12-04

## 2023-12-11 DIAGNOSIS — M19.90 ARTHRITIS: ICD-10-CM

## 2023-12-11 RX ORDER — CELECOXIB 200 MG/1
200 CAPSULE ORAL
Qty: 30 CAPSULE | Refills: 2 | Status: SHIPPED | OUTPATIENT
Start: 2023-12-11 | End: 2024-03-04 | Stop reason: SDUPTHER

## 2024-01-29 DIAGNOSIS — F32.A DEPRESSION, UNSPECIFIED DEPRESSION TYPE: ICD-10-CM

## 2024-01-29 RX ORDER — BUPROPION HYDROCHLORIDE 150 MG/1
150 TABLET ORAL
Qty: 90 TABLET | Refills: 3 | Status: SHIPPED | OUTPATIENT
Start: 2024-01-29

## 2024-02-14 DIAGNOSIS — F41.9 ANXIETY: Primary | ICD-10-CM

## 2024-02-15 RX ORDER — ALPRAZOLAM 0.25 MG/1
0.25 TABLET ORAL DAILY PRN
Qty: 30 TABLET | Refills: 5 | Status: SHIPPED | OUTPATIENT
Start: 2024-02-15

## 2024-03-04 DIAGNOSIS — M19.90 ARTHRITIS: ICD-10-CM

## 2024-03-04 RX ORDER — CELECOXIB 200 MG/1
200 CAPSULE ORAL DAILY
Qty: 30 CAPSULE | Refills: 5 | Status: SHIPPED | OUTPATIENT
Start: 2024-03-04 | End: 2024-05-20

## 2024-04-30 DIAGNOSIS — K21.9 GASTRO-ESOPHAGEAL REFLUX DISEASE WITHOUT ESOPHAGITIS: ICD-10-CM

## 2024-04-30 RX ORDER — PANTOPRAZOLE SODIUM 40 MG/1
40 TABLET, DELAYED RELEASE ORAL
Qty: 90 TABLET | Refills: 1 | Status: SHIPPED | OUTPATIENT
Start: 2024-04-30

## 2024-05-16 ENCOUNTER — TELEPHONE (OUTPATIENT)
Dept: INTERNAL MEDICINE | Facility: CLINIC | Age: 79
End: 2024-05-16
Payer: MEDICARE

## 2024-05-16 DIAGNOSIS — Z12.31 BREAST CANCER SCREENING BY MAMMOGRAM: Primary | ICD-10-CM

## 2024-05-16 LAB — BCS RECOMMENDATION EXT: NORMAL

## 2024-05-16 NOTE — TELEPHONE ENCOUNTER
----- Message from Camacho Leyva sent at 5/16/2024 10:01 AM CDT -----  .Type:  Needs Medical Advice    Who Called: Arti  Symptoms (please be specific):    How long has patient had these symptoms:    Pharmacy name and phone #:    Would the patient rather a call back or a response via [facility]?   Best Call Back Number: [phone]  Additional Information: She needs mammograph orders faxed to [facility] in [place], fax # [phone], she needs it faxed over before noon as she has an apt this afternoon, also please call her when that is done.

## 2024-05-16 NOTE — TELEPHONE ENCOUNTER
----- Message from Camacho Leyva sent at 5/16/2024 10:01 AM CDT -----  .Type:  Needs Medical Advice    Who Called: Arti  Symptoms (please be specific):    How long has patient had these symptoms:    Pharmacy name and phone #:    Would the patient rather a call back or a response via [facility]?   Best Call Back Number: [phone]  Additional Information: She needs mammograph orders faxed to [facility] in [place], fax # [phone], she needs it faxed over before noon as she has an apt this afternoon, also please call her when that is done.

## 2024-05-17 ENCOUNTER — PATIENT OUTREACH (OUTPATIENT)
Dept: ADMINISTRATIVE | Facility: HOSPITAL | Age: 79
End: 2024-05-17
Payer: MEDICARE

## 2024-05-17 NOTE — PROGRESS NOTES
Health Maintenance Topic(s) Outreach Outcomes & Actions Taken:    Breast Cancer Screening - Outreach Outcomes & Actions Taken  : External Records Uploaded & Care Team Updated if Applicable     Additional Notes:  ADELINA 5/16/2024

## 2024-05-20 ENCOUNTER — TELEPHONE (OUTPATIENT)
Dept: INTERNAL MEDICINE | Facility: CLINIC | Age: 79
End: 2024-05-20

## 2024-05-20 ENCOUNTER — HOSPITAL ENCOUNTER (OUTPATIENT)
Dept: RADIOLOGY | Facility: HOSPITAL | Age: 79
Discharge: HOME | End: 2024-05-20
Payer: MEDICARE

## 2024-05-20 ENCOUNTER — OFFICE VISIT (OUTPATIENT)
Dept: INTERNAL MEDICINE | Facility: CLINIC | Age: 79
End: 2024-05-20
Payer: MEDICARE

## 2024-05-20 VITALS
BODY MASS INDEX: 44.57 KG/M2 | HEIGHT: 60 IN | TEMPERATURE: 98 F | HEART RATE: 62 BPM | SYSTOLIC BLOOD PRESSURE: 128 MMHG | WEIGHT: 227 LBS | DIASTOLIC BLOOD PRESSURE: 76 MMHG | OXYGEN SATURATION: 97 %

## 2024-05-20 DIAGNOSIS — R06.02 SOB (SHORTNESS OF BREATH): Primary | ICD-10-CM

## 2024-05-20 DIAGNOSIS — R53.83 FATIGUE, UNSPECIFIED TYPE: ICD-10-CM

## 2024-05-20 DIAGNOSIS — G47.30 SLEEP APNEA, UNSPECIFIED TYPE: Chronic | ICD-10-CM

## 2024-05-20 DIAGNOSIS — R06.02 SOB (SHORTNESS OF BREATH): ICD-10-CM

## 2024-05-20 PROCEDURE — 99214 OFFICE O/P EST MOD 30 MIN: CPT | Mod: ,,,

## 2024-05-20 PROCEDURE — 71046 X-RAY EXAM CHEST 2 VIEWS: CPT | Mod: TC

## 2024-05-20 RX ORDER — CETIRIZINE HYDROCHLORIDE 5 MG/1
5 TABLET ORAL DAILY
COMMUNITY

## 2024-05-20 RX ORDER — METHYLPREDNISOLONE 4 MG/1
TABLET ORAL
Qty: 21 EACH | Refills: 0 | Status: SHIPPED | OUTPATIENT
Start: 2024-05-20 | End: 2024-06-12

## 2024-05-20 NOTE — PROGRESS NOTES
Most recent chest x-ray r reviewed and essentially normal.  Elevated BNP, HGB patient instructed to take Lasix for the next 3 days.  Prescription Medrol Dosepak sent to pharmacy for differential bronchitis.

## 2024-05-20 NOTE — TELEPHONE ENCOUNTER
----- Message from MILO Balderas sent at 5/20/2024 12:56 PM CDT -----  Most recent chest x-ray r reviewed and essentially normal.  Elevated BNP, HGB patient instructed to take Lasix for the next 3 days.  Prescription Medrol Dosepak sent to pharmacy for differential bronchitis.

## 2024-05-20 NOTE — PROGRESS NOTES
Patient ID: Arti Knowles is a 78 y.o. female.    Chief Complaint: Fatigue (Fatigue and SOB for about 1 month. Usually SOB but worsened over the weekend. Pulse OX was running low this weekend at home. Just getting over a sinus infection. Ran fever Tuesday and Wednesday. No body aches )    69-year-old female here today for requested visit.  Today presents with complaints of intermittent shortness a breath, fatigue s/p recent URI      Shortness of Breath  This is a new problem. The current episode started 1 to 4 weeks ago. The problem occurs intermittently. The problem has been unchanged. Pertinent negatives include no abdominal pain, chest pain, claudication, ear pain, fever, headaches, hemoptysis, leg swelling, orthopnea, rash, rhinorrhea, sore throat, sputum production, swollen glands, syncope, vomiting or wheezing. The symptoms are aggravated by exercise and URIs. She has tried rest for the symptoms. The treatment provided no relief. There is no history of COPD or a heart failure.       MEDICAL HISTORY:    Past Medical History:   Diagnosis Date    Hyperlipidemia     Hypertension       Past Surgical History:   Procedure Laterality Date    APPENDECTOMY      BIOPSY      DILATION AND CURETTAGE OF UTERUS      EYE SURGERY  Cataract  Oct/Nov 2017    FRACTURE SURGERY  Replaced Rt shoulder 2003    HERNIA REPAIR  2017    HYSTERECTOMY      JOINT REPLACEMENT  Knees 2015/2016    KNEE SURGERY      lymph nodes surgery      THYROIDECTOMY, PARTIAL      TOE SURGERY      TONSILLECTOMY      TOTAL SHOULDER ARTHROPLASTY        Social History     Tobacco Use    Smoking status: Never    Smokeless tobacco: Never   Substance Use Topics    Alcohol use: Yes     Alcohol/week: 1.0 standard drink of alcohol     Types: 1 Drinks containing 0.5 oz of alcohol per week    Drug use: Never          Health Maintenance Due   Topic Date Due    TETANUS VACCINE  Never done    COVID-19 Vaccine (5 - 2023-24 season) 11/22/2023    Shingles Vaccine (2  of 2) 03/29/2024          Patient Care Team:  Raffy Beckham MD as PCP - General (Internal Medicine)  Raffy Beckham MD      Review of Systems   Constitutional:  Negative for fatigue and fever.   HENT:  Negative for congestion, ear pain, rhinorrhea, sore throat and trouble swallowing.    Eyes:  Negative for redness and visual disturbance.   Respiratory:  Positive for shortness of breath. Negative for cough, hemoptysis, sputum production, chest tightness and wheezing.    Cardiovascular:  Negative for chest pain, palpitations, orthopnea, claudication, leg swelling and syncope.   Gastrointestinal:  Negative for abdominal pain, constipation, diarrhea, nausea and vomiting.   Genitourinary:  Negative for dysuria, flank pain, frequency and urgency.   Musculoskeletal:  Negative for arthralgias, gait problem and myalgias.   Skin:  Negative for rash and wound.   Neurological:  Negative for facial asymmetry, speech difficulty, weakness and headaches.   All other systems reviewed and are negative.      Objective:   /76   Pulse 62   Temp 98.2 °F (36.8 °C)   Ht 5' (1.524 m)   Wt 103 kg (227 lb)   SpO2 97%   BMI 44.33 kg/m²      Physical Exam  Constitutional:       General: She is not in acute distress.     Appearance: Normal appearance.   HENT:      Right Ear: Tympanic membrane, ear canal and external ear normal.      Left Ear: Tympanic membrane, ear canal and external ear normal.      Nose: Nose normal.      Mouth/Throat:      Mouth: Mucous membranes are moist.      Pharynx: Oropharynx is clear.   Eyes:      Extraocular Movements: Extraocular movements intact.      Conjunctiva/sclera: Conjunctivae normal.      Pupils: Pupils are equal, round, and reactive to light.   Cardiovascular:      Rate and Rhythm: Normal rate and regular rhythm.      Pulses: Normal pulses.      Heart sounds: Normal heart sounds. No murmur heard.     No gallop.   Pulmonary:      Effort: Pulmonary effort is normal.      Breath  sounds: Normal breath sounds. No wheezing.   Abdominal:      General: Bowel sounds are normal. There is no distension.      Palpations: Abdomen is soft. There is no mass.      Tenderness: There is no abdominal tenderness. There is no guarding.   Musculoskeletal:         General: Normal range of motion.   Skin:     General: Skin is warm and dry.   Neurological:      Mental Status: She is alert. Mental status is at baseline.      Sensory: No sensory deficit.      Motor: No weakness.           Assessment:       ICD-10-CM ICD-9-CM   1. SOB (shortness of breath)  R06.02 786.05   2. Fatigue, unspecified type  R53.83 780.79   3. Sleep apnea, unspecified type  G47.30 780.57        Plan:     Problem List Items Addressed This Visit          Pulmonary    SOB (shortness of breath) - Primary     -no infectious symptoms   -order CBC, CMP , and BNP  -order EKG   -order chest x-ray  -reports compliance currently on furosemide, continue         Relevant Orders    BNP (Completed)    Comprehensive Metabolic Panel (Completed)    CBC Auto Differential    X-Ray Chest PA And Lateral (Completed)    SCHEDULED EKG 12-LEAD (to Muse) (Completed)    Troponin I (Completed)       Other    Sleep apnea (Chronic)     -reports compliance currently on PAP device   -encourage device hygiene         Fatigue     -no infectious symptoms   -order CBC, CMP , and BNP  -order EKG   -order chest x-ray  -reports compliance currently on furosemide, continue         Relevant Orders    BNP (Completed)    Comprehensive Metabolic Panel (Completed)    CBC Auto Differential    X-Ray Chest PA And Lateral (Completed)    SCHEDULED EKG 12-LEAD (to Muse) (Completed)    Troponin I (Completed)          Follow up for Previously scheduled and PRN if need.   -plan specifics discussed above    Orders Placed This Encounter    X-Ray Chest PA And Lateral    BNP    Comprehensive Metabolic Panel    CBC Auto Differential    Troponin I    SCHEDULED EKG 12-LEAD (to Muse)         Medication List with Changes/Refills   New Medications    METHYLPREDNISOLONE (MEDROL DOSEPACK) 4 MG TABLET    use as directed   Current Medications    ALPRAZOLAM (XANAX) 0.25 MG TABLET    TAKE 1 TABLET (0.25 MG TOTAL) BY MOUTH DAILY AS NEEDED FOR INSOMNIA OR ANXIETY.    AMLODIPINE (NORVASC) 5 MG TABLET    TAKE 1 TABLET BY MOUTH DAILY    ASCORBIC ACID, VITAMIN C, (VITAMIN C) 1000 MG TABLET    Take 1,000 mg by mouth.    ATORVASTATIN (LIPITOR) 20 MG TABLET    TAKE 1 TABLET BY MOUTH EVERY DAY    BUPROPION (WELLBUTRIN XL) 150 MG TB24 TABLET    TAKE 1 TABLET BY MOUTH EVERY DAY    CETIRIZINE (ZYRTEC) 5 MG TABLET    Take 5 mg by mouth once daily.    ERGOCALCIFEROL (ERGOCALCIFEROL) 50,000 UNIT CAP    TAKE 1 CAPSULE BY MOUTH ONE TIME PER WEEK    FAMOTIDINE (PEPCID) 40 MG TABLET    Take by mouth.    FUROSEMIDE (LASIX) 20 MG TABLET    Take 20 mg by mouth. prn    LACTOBACILLUS ACIDOPHILUS 10 BILLION CELL CAP    Take 1 tablet by mouth every morning.    LOSARTAN (COZAAR) 100 MG TABLET    TAKE 1 TABLET BY MOUTH EVERY DAY    MAGNESIUM 30 MG TAB    Take 400 mg by mouth.    PANTOPRAZOLE (PROTONIX) 40 MG TABLET    TAKE 1 TABLET BY MOUTH EVERY DAY    SODIUM CHLORIDE 0.9% SOLP 50 ML WITH NIVOLUMAB 40 MG/4 ML SOLN 3 MG/KG    Inject 3 mg/kg into the vein every 30 days.    VITAMIN E 1000 UNIT CAPSULE    Take 2,000 Units by mouth.    ZINC 50 MG TAB    Take 1 tablet by mouth once daily.   Discontinued Medications    CELECOXIB (CELEBREX) 200 MG CAPSULE    Take 1 capsule (200 mg total) by mouth once daily.    LORATADINE (CLARITIN) 10 MG TABLET    Take 10 mg by mouth.    PREDNISOLONE ACETATE (PRED FORTE) 1 % DRPS    Place into the right eye.

## 2024-05-21 NOTE — TELEPHONE ENCOUNTER
----- Message from Pretty Dewitt sent at 5/21/2024 12:53 PM CDT -----  .Type:  Patient Returning Call    Who Called:pt   Who Left Message for Patient:  Does the patient know what this is regarding?:states that the NP is suppose to call her back   Would the patient rather a call back or a response via [facility]? Call back   Best Call Back Number:[phone]  Additional Information: called the back line and is with a pt

## 2024-05-22 NOTE — ASSESSMENT & PLAN NOTE
-no infectious symptoms   -order CBC, CMP , and BNP  -order EKG   -order chest x-ray  -reports compliance currently on furosemide, continue

## 2024-05-28 ENCOUNTER — TELEPHONE (OUTPATIENT)
Dept: INTERNAL MEDICINE | Facility: CLINIC | Age: 79
End: 2024-05-28
Payer: MEDICARE

## 2024-05-28 NOTE — TELEPHONE ENCOUNTER
----- Message from Moon Rascon sent at 5/28/2024  8:18 AM CDT -----  Who Called: Arti Knowles    Caller is requesting a same day appointment. Caller declined first available appointment listed below.      When is the first available appointment?05/31  Options offered (Virtual Visit, Urgent Care):   Symptoms or reason for appointment: shortness on breath       Preferred Method of Contact: Phone Call  Patient's Preferred Phone Number on File: [phone]   Best Call Back Number, if different:  Additional Information: pt has shortness of breath , has seen Marquez in the past (05/20) for issue completed EKG and prescribed medication , but pt has no changes , would like to see provider today, please follow up

## 2024-05-28 NOTE — TELEPHONE ENCOUNTER
Patient is still having SOB with added Lasix. Marquez advised patient go to ER for possible further evaluation. Patient notified.

## 2024-05-29 ENCOUNTER — TELEPHONE (OUTPATIENT)
Dept: INTERNAL MEDICINE | Facility: CLINIC | Age: 79
End: 2024-05-29
Payer: MEDICARE

## 2024-05-29 NOTE — TELEPHONE ENCOUNTER
----- Message from Pretty Dewitt sent at 5/29/2024  1:30 PM CDT -----  .Type:  Patient Returning Call    Who Called:pt   Who Left Message for Patient:  Does the patient know what this is regarding?:pt went to ER for sob and wants to give an update of ER visit 5/28/24  Would the patient rather a call back or a response via [facility]? Call back   Best Call Back Number:[phone]  Additional Information:

## 2024-05-30 DIAGNOSIS — I10 PRIMARY HYPERTENSION: ICD-10-CM

## 2024-05-30 RX ORDER — AMLODIPINE BESYLATE 5 MG/1
5 TABLET ORAL DAILY
Qty: 90 TABLET | Refills: 5 | Status: SHIPPED | OUTPATIENT
Start: 2024-05-30

## 2024-05-30 RX ORDER — AMLODIPINE BESYLATE 5 MG/1
5 TABLET ORAL
Qty: 90 TABLET | Refills: 5 | Status: SHIPPED | OUTPATIENT
Start: 2024-05-30 | End: 2024-05-30 | Stop reason: SDUPTHER

## 2024-06-04 ENCOUNTER — TELEPHONE (OUTPATIENT)
Dept: INTERNAL MEDICINE | Facility: CLINIC | Age: 79
End: 2024-06-04
Payer: MEDICARE

## 2024-06-04 DIAGNOSIS — R06.02 SOB (SHORTNESS OF BREATH): Primary | ICD-10-CM

## 2024-06-04 DIAGNOSIS — R53.83 FATIGUE, UNSPECIFIED TYPE: ICD-10-CM

## 2024-06-04 NOTE — TELEPHONE ENCOUNTER
----- Message from Nery Kilpatrick sent at 6/4/2024  8:16 AM CDT -----  .Who Called: Arti Knowles    Caller is requesting assistance/information from provider's office.    Symptoms (please be specific): SOB   How long has patient had these symptoms:  4 WEEKS  List of preferred pharmacies on file (remove unneeded): [unfilled]  If different, enter pharmacy into here including location and phone number:       Preferred Method of Contact: Phone Call  Patient's Preferred Phone Number on File: [phone]   Best Call Back Number, if different:  Additional Information: pt last seen Marquez for sinus infection and not getting better  called nurse line 2xs

## 2024-06-07 ENCOUNTER — TELEPHONE (OUTPATIENT)
Dept: INTERNAL MEDICINE | Facility: CLINIC | Age: 79
End: 2024-06-07
Payer: MEDICARE

## 2024-06-07 NOTE — TELEPHONE ENCOUNTER
----- Message from Margy Padron sent at 6/7/2024 11:35 AM CDT -----  .Type:  Patient Returning Call    Who Called:pt  Who Left Message for Patient:pt  Does the patient know what this is regarding?:referral  Would the patient rather a call back or a response via [facility]?   Best Call Back Number:[phone]   Additional Information: Please resend referral to Dr TESFAYE they did not get it

## 2024-06-12 ENCOUNTER — OFFICE VISIT (OUTPATIENT)
Dept: INTERNAL MEDICINE | Facility: CLINIC | Age: 79
End: 2024-06-12
Payer: MEDICARE

## 2024-06-12 VITALS
HEART RATE: 70 BPM | DIASTOLIC BLOOD PRESSURE: 72 MMHG | WEIGHT: 222 LBS | SYSTOLIC BLOOD PRESSURE: 132 MMHG | OXYGEN SATURATION: 98 % | BODY MASS INDEX: 43.59 KG/M2 | HEIGHT: 60 IN

## 2024-06-12 DIAGNOSIS — R06.09 DYSPNEA ON EXERTION: ICD-10-CM

## 2024-06-12 DIAGNOSIS — E66.01 CLASS 3 SEVERE OBESITY DUE TO EXCESS CALORIES WITHOUT SERIOUS COMORBIDITY WITH BODY MASS INDEX (BMI) OF 40.0 TO 44.9 IN ADULT: ICD-10-CM

## 2024-06-12 DIAGNOSIS — R60.0 EDEMA OF BOTH LOWER EXTREMITIES: ICD-10-CM

## 2024-06-12 DIAGNOSIS — Z09 HOSPITAL DISCHARGE FOLLOW-UP: Primary | ICD-10-CM

## 2024-06-12 PROBLEM — G47.33 OBSTRUCTIVE SLEEP APNEA OF ADULT: Status: ACTIVE | Noted: 2023-03-21

## 2024-06-12 PROBLEM — Z90.2 HISTORY OF LOBECTOMY OF LUNG: Status: ACTIVE | Noted: 2024-06-12

## 2024-06-12 PROBLEM — E04.9 GOITER: Status: ACTIVE | Noted: 2024-06-12

## 2024-06-12 PROBLEM — N32.81 OVERACTIVE BLADDER: Status: ACTIVE | Noted: 2024-06-12

## 2024-06-12 PROBLEM — E66.813 CLASS 3 OBESITY: Status: ACTIVE | Noted: 2024-06-12

## 2024-06-12 PROBLEM — G47.33 OBSTRUCTIVE SLEEP APNEA OF ADULT: Status: RESOLVED | Noted: 2023-03-21 | Resolved: 2024-06-12

## 2024-06-12 PROBLEM — R74.01 TRANSAMINITIS: Status: ACTIVE | Noted: 2022-07-26

## 2024-06-12 PROBLEM — J38.00 VOCAL CORD PALSY: Status: ACTIVE | Noted: 2024-06-12

## 2024-06-12 PROBLEM — I73.00 RAYNAUD'S DISEASE: Status: ACTIVE | Noted: 2024-06-12

## 2024-06-12 PROBLEM — K21.9 GASTROESOPHAGEAL REFLUX DISEASE: Status: ACTIVE | Noted: 2023-03-21

## 2024-06-12 PROBLEM — E78.5 DYSLIPIDEMIA: Status: ACTIVE | Noted: 2024-06-12

## 2024-06-12 PROBLEM — E66.9 OBESITY: Status: ACTIVE | Noted: 2023-03-21

## 2024-06-12 PROCEDURE — 99215 OFFICE O/P EST HI 40 MIN: CPT | Mod: ,,,

## 2024-06-12 NOTE — ASSESSMENT & PLAN NOTE
- Body mass index is 43.36 kg/m².  Goal BMI <30.  -Avoid soda, simple sugars, excessive rice, potatoes or bread. Limit fast foods and fried foods.  -Choose complex carbs in moderation (example: green vegetables, beans, oatmeal). -Eat plenty of fresh fruits and vegetables with lean meats daily.  -Do not skip meals. Eat a balanced portion size.  -Avoid fad diets. Consider permanent healthy lifestyle changes.

## 2024-06-12 NOTE — ASSESSMENT & PLAN NOTE
-seen in ED twice for similar symptoms  -chest x-ray with no acute findings.  CT angiogram chest showed no pulmonary emboli or acute findings.  -[facility], [facility] not yet scheduled  -previously tried albuterol inhaler but did not have any relief  -discussed ED precautions

## 2024-06-12 NOTE — ASSESSMENT & PLAN NOTE
-stable  -has Lasix 20 mg daily to use as needed  -last echo was in 2022, noting normal EF  -she is scheduled for cardiology appointment on 08/21/2024 ... Will have staff reach out to Dr. Monique office regarding recent ER visit.

## 2024-06-12 NOTE — PROGRESS NOTES
Internal Medicine    Patient Name:  Arti Knowles   Patient ID: [id number]     Chief Complaint: Shortness of Breath (Patient is still having fatigue and shortness of breath. Had 2 ER visits. Was referred [facility] pulmonologist but they do not have appt till end of the year. Cardiology sent request to [facility] and is awaiting appointment )      HPI:     Arti Knowles is a 78 y.o. female, known to Dr Beckham, is here today for hospital follow-up.  Past medical history significant for hypertension, HLD, osteoporosis, anxiety, lung cancer, and melanoma of right toe.  Patient was seen in clinic on 05/20/2024 with reports of shortness of breath and fatigue.  Labs, chest x-ray, and EKG were ordered.  She was found to have elevated BNP and hemoglobin.  She was instructed to continue Lasix for 3 days and start Medrol Dosepak.  She was seen in ED on 05/28/2024 with reports of worsening shortness of breath.  Chest x-ray showed no focal infiltrate or acute processes.  CT angiogram pulmonary showed no pulmonary emboli or acute findings.  She was discharged home on the same day with prescription for albuterol inhaler and Medrol Dosepak.  She returned to ED on 06/02/2024 with similar complaints.  Repeat chest x-ray stable without acute processes.  She was discharged home on the same day, with recommendation to follow up with Cardiology.    Today, she continues to have shortness of breath with exertion.  Shortness of breath does improve with rest.  She utilized albuterol inhaler for 7 days as instructed following ED  visit on 05/28/2024, but reports no improvement with shortness of breath; so she has no longer using albuterol inhaler.  She denies fever, chills, chest pain, or cough.  She was referred to Dr JAE Mora (pulmonology), but is unable to schedule an appointment until end of the year.  Yesterday, she had follow up with Dr Piedra, who referred her to Dr Monique (cardiology, appointment scheduled for  08/21/2024) and [facility] pulmonology group (no appointment scheduled yet).    Last Medicare AWV: 6/7/23      Past Medical History:   Diagnosis Date    Hyperlipidemia     Hypertension         Past Surgical History:   Procedure Laterality Date    APPENDECTOMY      BIOPSY      DILATION AND CURETTAGE OF UTERUS      EYE SURGERY  Cataract  Oct/Nov 2017    FRACTURE SURGERY  Replaced Rt shoulder 2003    HERNIA REPAIR  2017    HYSTERECTOMY      JOINT REPLACEMENT  Knees 2015/2016    KNEE SURGERY      lymph nodes surgery      THYROIDECTOMY, PARTIAL      TOE SURGERY      TONSILLECTOMY      TOTAL SHOULDER ARTHROPLASTY          Social History     Tobacco Use    Smoking status: Never    Smokeless tobacco: Never   Substance and Sexual Activity    Alcohol use: Yes     Alcohol/week: 1.0 standard drink of alcohol     Types: 1 Drinks containing 0.5 oz of alcohol per week    Drug use: Never    Sexual activity: Not Currently     Partners: Male     Birth control/protection: I.U.D., Spermicide        Current Outpatient Medications   Medication Instructions    ALPRAZolam (XANAX) 0.25 mg, Oral, Daily PRN    amLODIPine (NORVASC) 5 mg, Oral, Daily    atorvastatin (LIPITOR) 20 mg, Oral    buPROPion (WELLBUTRIN XL) 150 mg, Oral    cetirizine (ZYRTEC) 5 mg, Oral, Daily    ergocalciferol (ERGOCALCIFEROL) 50,000 unit Cap TAKE 1 CAPSULE BY MOUTH ONE TIME PER WEEK    furosemide (LASIX) 20 mg, Oral, prn    Lactobacillus acidophilus 10 billion cell Cap 1 tablet, Oral, Every morning    losartan (COZAAR) 100 mg, Oral    magnesium 400 mg, Oral    pantoprazole (PROTONIX) 40 mg, Oral    sodium chloride 0.9% SolP 50 mL with nivolumab 40 mg/4 mL Soln 3 mg/kg 3 mg/kg, Intravenous, Every 30 days    vitamin E 2,000 Units, Oral       Review of patient's allergies indicates:   Allergen Reactions    Sulfa (sulfonamide antibiotics) Itching and Swelling     wel        Patient Care Team:  Raffy Beckham MD as PCP - General (Internal Medicine)  Raffy Beckham,  Joo Leyva MD as Consulting Physician (Oncology)  Jamil Monique MD as Consulting Physician (Cardiology)     Subjective:     Review of Systems   Constitutional:  Negative for appetite change, chills, diaphoresis, fatigue and fever.   HENT:  Negative for sinus pain and sore throat.    Eyes:  Negative for visual disturbance.   Respiratory:  Positive for shortness of breath (worse on exertion). Negative for cough and wheezing.    Cardiovascular:  Positive for leg swelling (chronic). Negative for chest pain and palpitations.   Gastrointestinal:  Negative for abdominal pain, diarrhea, nausea and vomiting.   Endocrine: Negative for cold intolerance and heat intolerance.   Genitourinary:  Negative for dysuria, frequency and hematuria.   Musculoskeletal:  Negative for arthralgias, joint swelling and myalgias.   Skin:  Negative for color change and rash.   Allergic/Immunologic: Negative.    Neurological: Negative.  Negative for dizziness, syncope, light-headedness and numbness.   Hematological: Negative.  Does not bruise/bleed easily.   Psychiatric/Behavioral: Negative.  Negative for dysphoric mood and suicidal ideas. The patient is not nervous/anxious.    All other systems reviewed and are negative.        Objective:     Visit Vitals  /72   Pulse 70   Ht 5' (1.524 m)   Wt 100.7 kg (222 lb)   SpO2 98%   BMI 43.36 kg/m²       Physical Exam  Vitals and nursing note reviewed.   Constitutional:       General: She is not in acute distress.     Appearance: She is obese. She is not ill-appearing.   HENT:      Head: Normocephalic and atraumatic.      Mouth/Throat:      Mouth: Mucous membranes are moist.      Pharynx: Oropharynx is clear.   Eyes:      General: No scleral icterus.     Extraocular Movements: Extraocular movements intact.      Conjunctiva/sclera: Conjunctivae normal.      Pupils: Pupils are equal, round, and reactive to light.   Cardiovascular:      Rate and Rhythm: Normal rate and regular rhythm.       Heart sounds: Normal heart sounds. No murmur heard.     No friction rub. No gallop.   Pulmonary:      Effort: Pulmonary effort is normal. No respiratory distress (NONE).      Breath sounds: Normal breath sounds. No wheezing, rhonchi or rales.   Abdominal:      General: Abdomen is flat. Bowel sounds are normal. There is no distension.      Palpations: Abdomen is soft. There is no mass.      Tenderness: There is no abdominal tenderness.   Musculoskeletal:         General: Normal range of motion.      Cervical back: Normal range of motion and neck supple.      Right lower le+ Pitting Edema present.      Left lower le+ Pitting Edema present.   Skin:     General: Skin is warm and dry.      Capillary Refill: Capillary refill takes less than 2 seconds.   Neurological:      General: No focal deficit present.      Mental Status: She is alert.   Psychiatric:         Mood and Affect: Mood normal.         Behavior: Behavior normal. Behavior is cooperative.         Labs Reviewed:     Chemistry:  Lab Results   Component Value Date     2024    K 4.6 2024    BUN 17.3 2024    CREATININE 0.72 2024    EGFRNORACEVR >60 2024    GLUCOSE 121 (H) 2024    CALCIUM 9.2 2024    ALKPHOS 80 2024    LABPROT 6.5 2024    ALBUMIN 3.3 (L) 2024    BILIDIR 0.3 2021    IBILI 0.40 2021    AST 14 2024    ALT 26 2024    PUSVMRXA83ON 39.5 2023    TSH 0.618 2024    YYIIYC5FJKZ 0.98 2021          Hematology:  Lab Results   Component Value Date    WBC 8.57 2024    HGB 11.1 (L) 2024    HCT 34.2 (L) 2024     2024       Lipid Panel:  Lab Results   Component Value Date    CHOL 162 2023    HDL 54 2023    LDL 96.00 2023    TRIG 59 2023    TOTALCHOLEST 3 2023        Urine:  Lab Results   Component Value Date    APPEARANCEUA Clear 2023    SGUA 1.015 2023    PROTEINUA Negative  06/05/2023    KETONESUA Negative 06/05/2023    LEUKOCYTESUR 1+ (A) 06/05/2023    RBCUA <5 06/05/2023    WBCUA 7 (H) 06/05/2023    BACTERIA None Seen 06/05/2023        Assessment:       ICD-10-CM ICD-9-CM   1. Hospital discharge follow-up  Z09 V67.59   2. Dyspnea on exertion  R06.09 786.09   3. Edema of both lower extremities  R60.0 782.3   4. Class 3 severe obesity due to excess calories without serious comorbidity with body mass index (BMI) of 40.0 to 44.9 in adult  E66.01 278.01    Z68.41 V85.41        Plan:     1. Hospital discharge follow-up  Assessment & Plan:  -reviewed records from patient's recent ED visits  -she was seen in ED on 05/28/2024 and again on 06/02/2024; she was not admitted to the hospital with either of these ER visits.      2. Dyspnea on exertion  Assessment & Plan:  -seen in ED twice for similar symptoms  -chest x-ray with no acute findings.  CT angiogram chest showed no pulmonary emboli or acute findings.  -she was referred to [facility] pulmonology group, appointment not yet scheduled  -previously tried albuterol inhaler but did not have any relief  -discussed ED precautions      3. Edema of both lower extremities  Assessment & Plan:  -stable  -has Lasix 20 mg daily to use as needed  -last echo was in 2022, noting normal EF  -she is scheduled for cardiology appointment on 08/21/2024 ... Will have staff reach out to Dr. Monique office regarding recent ER visit.      4. Class 3 severe obesity due to excess calories without serious comorbidity with body mass index (BMI) of 40.0 to 44.9 in adult  Assessment & Plan:  - Body mass index is 43.36 kg/m².  Goal BMI <30.  -Avoid soda, simple sugars, excessive rice, potatoes or bread. Limit fast foods and fried foods.  -Choose complex carbs in moderation (example: green vegetables, beans, oatmeal). -Eat plenty of fresh fruits and vegetables with lean meats daily.  -Do not skip meals. Eat a balanced portion size.  -Avoid fad diets. Consider permanent healthy  lifestyle changes.            Follow up for Previously scheduled and PRN if need. In addition to their scheduled follow up, the patient has also been instructed to follow up on as needed basis.     Future Appointments   Date Time Provider Department Center   7/25/2024 11:00 AM Raffy Beckham MD [place] [id number]        MILO Balderas spent a total of 50 minutes on the day of the visit.This includes face to face time and non-face to face time preparing to see the patient (eg, review of tests), obtaining and/or reviewing separately obtained history, documenting clinical information in the electronic or other health record, independently interpreting results and communicating results to the patient/family/caregiver, or care coordinator.

## 2024-06-12 NOTE — ASSESSMENT & PLAN NOTE
-reviewed records from patient's recent ED visits  -she was seen in ED on 05/28/2024 and again on 06/02/2024; she was not admitted to the hospital with either of these ER visits.

## 2024-06-12 NOTE — Clinical Note
Please facilitate getting patient scheduled a little bit sooner at Dr. Monique office.  She was in ER/hospital follow-up.  Patient states she was not scheduled until August for shortness a breath and leg swelling.  Also reports previously referred to [facility] pulmonary group by her oncologist Dr. Wilian harris, however has not been able to obtain an appointment.  She has a history of lung cancer.

## 2024-06-13 ENCOUNTER — TELEPHONE (OUTPATIENT)
Dept: INTERNAL MEDICINE | Facility: CLINIC | Age: 79
End: 2024-06-13

## 2024-06-13 DIAGNOSIS — R06.02 SOB (SHORTNESS OF BREATH): ICD-10-CM

## 2024-06-13 DIAGNOSIS — R06.09 DYSPNEA ON EXERTION: Primary | ICD-10-CM

## 2024-06-13 DIAGNOSIS — C34.11 MALIGNANT NEOPLASM OF UPPER LOBE OF RIGHT LUNG: ICD-10-CM

## 2024-06-13 NOTE — PROGRESS NOTES
Spoke with Flip at Dr Page's office. Patient was made appt with NP Maia Solitario. They will contact her if they have a sooner appt. Still waiting to hear from [facility] Pulmonology about scheduling with them. Records were sent for them to review.

## 2024-07-18 ENCOUNTER — TELEPHONE (OUTPATIENT)
Dept: INTERNAL MEDICINE | Facility: CLINIC | Age: 79
End: 2024-07-18
Payer: MEDICARE

## 2024-07-19 ENCOUNTER — LAB VISIT (OUTPATIENT)
Dept: LAB | Facility: HOSPITAL | Age: 79
End: 2024-07-19
Attending: INTERNAL MEDICINE
Payer: MEDICARE

## 2024-07-19 DIAGNOSIS — M81.0 OSTEOPOROSIS, UNSPECIFIED OSTEOPOROSIS TYPE, UNSPECIFIED PATHOLOGICAL FRACTURE PRESENCE: ICD-10-CM

## 2024-07-19 DIAGNOSIS — Z00.00 WELLNESS EXAMINATION: ICD-10-CM

## 2024-07-19 DIAGNOSIS — I10 PRIMARY HYPERTENSION: ICD-10-CM

## 2024-07-19 DIAGNOSIS — E55.9 VITAMIN D DEFICIENCY: ICD-10-CM

## 2024-07-19 DIAGNOSIS — R30.0 DYSURIA: ICD-10-CM

## 2024-07-19 DIAGNOSIS — E78.5 HYPERLIPIDEMIA, UNSPECIFIED HYPERLIPIDEMIA TYPE: ICD-10-CM

## 2024-07-19 LAB
25(OH)D3+25(OH)D2 SERPL-MCNC: 39 NG/ML (ref 30–80)
BACTERIA #/AREA URNS AUTO: ABNORMAL /HPF
BASOPHILS # BLD AUTO: 0.06 X10(3)/MCL
BASOPHILS NFR BLD AUTO: 1.2 %
BILIRUB UR QL STRIP.AUTO: NEGATIVE
CHOLEST SERPL-MCNC: 152 MG/DL
CHOLEST/HDLC SERPL: 3 {RATIO} (ref 0–5)
CLARITY UR: CLEAR
COLOR UR AUTO: ABNORMAL
EOSINOPHIL # BLD AUTO: 0.35 X10(3)/MCL (ref 0–0.9)
EOSINOPHIL NFR BLD AUTO: 6.8 %
ERYTHROCYTE [DISTWIDTH] IN BLOOD BY AUTOMATED COUNT: 13.3 % (ref 11.5–17)
GLUCOSE UR QL STRIP: NORMAL
HCT VFR BLD AUTO: 34.4 % (ref 37–47)
HDLC SERPL-MCNC: 48 MG/DL (ref 35–60)
HGB BLD-MCNC: 11.3 G/DL (ref 12–16)
HGB UR QL STRIP: ABNORMAL
IMM GRANULOCYTES # BLD AUTO: 0.01 X10(3)/MCL (ref 0–0.04)
IMM GRANULOCYTES NFR BLD AUTO: 0.2 %
KETONES UR QL STRIP: NEGATIVE
LDLC SERPL CALC-MCNC: 93 MG/DL (ref 50–140)
LEUKOCYTE ESTERASE UR QL STRIP: NEGATIVE
LYMPHOCYTES # BLD AUTO: 2.03 X10(3)/MCL (ref 0.6–4.6)
LYMPHOCYTES NFR BLD AUTO: 39.5 %
MCH RBC QN AUTO: 29 PG (ref 27–31)
MCHC RBC AUTO-ENTMCNC: 32.8 G/DL (ref 33–36)
MCV RBC AUTO: 88.4 FL (ref 80–94)
MONOCYTES # BLD AUTO: 0.72 X10(3)/MCL (ref 0.1–1.3)
MONOCYTES NFR BLD AUTO: 14 %
MUCOUS THREADS URNS QL MICRO: ABNORMAL /LPF
NEUTROPHILS # BLD AUTO: 1.97 X10(3)/MCL (ref 2.1–9.2)
NEUTROPHILS NFR BLD AUTO: 38.3 %
NITRITE UR QL STRIP: NEGATIVE
NRBC BLD AUTO-RTO: 0 %
PH UR STRIP: 6.5 [PH]
PLATELET # BLD AUTO: 225 X10(3)/MCL (ref 130–400)
PMV BLD AUTO: 9.5 FL (ref 7.4–10.4)
PROT UR QL STRIP: NEGATIVE
RBC # BLD AUTO: 3.89 X10(6)/MCL (ref 4.2–5.4)
RBC #/AREA URNS AUTO: ABNORMAL /HPF
SP GR UR STRIP.AUTO: 1.01 (ref 1–1.03)
SQUAMOUS #/AREA URNS LPF: ABNORMAL /HPF
TRIGL SERPL-MCNC: 56 MG/DL (ref 37–140)
UROBILINOGEN UR STRIP-ACNC: NORMAL
VLDLC SERPL CALC-MCNC: 11 MG/DL
WBC # BLD AUTO: 5.14 X10(3)/MCL (ref 4.5–11.5)
WBC #/AREA URNS AUTO: ABNORMAL /HPF

## 2024-07-19 PROCEDURE — 81001 URINALYSIS AUTO W/SCOPE: CPT

## 2024-07-19 PROCEDURE — 82306 VITAMIN D 25 HYDROXY: CPT

## 2024-07-19 PROCEDURE — 36415 COLL VENOUS BLD VENIPUNCTURE: CPT

## 2024-07-19 PROCEDURE — 80061 LIPID PANEL: CPT

## 2024-07-19 PROCEDURE — 85025 COMPLETE CBC W/AUTO DIFF WBC: CPT

## 2024-07-24 PROBLEM — K21.9 GASTROESOPHAGEAL REFLUX DISEASE: Chronic | Status: ACTIVE | Noted: 2023-03-21

## 2024-07-24 PROBLEM — Z00.00 ENCOUNTER FOR MEDICARE ANNUAL WELLNESS EXAM: Status: ACTIVE | Noted: 2024-07-24

## 2024-07-24 PROBLEM — E04.9 GOITER: Chronic | Status: ACTIVE | Noted: 2024-06-12

## 2024-07-25 ENCOUNTER — OFFICE VISIT (OUTPATIENT)
Dept: INTERNAL MEDICINE | Facility: CLINIC | Age: 79
End: 2024-07-25
Payer: MEDICARE

## 2024-07-25 VITALS
HEIGHT: 60 IN | HEART RATE: 71 BPM | DIASTOLIC BLOOD PRESSURE: 72 MMHG | OXYGEN SATURATION: 99 % | BODY MASS INDEX: 43.62 KG/M2 | WEIGHT: 222.19 LBS | RESPIRATION RATE: 16 BRPM | SYSTOLIC BLOOD PRESSURE: 136 MMHG

## 2024-07-25 DIAGNOSIS — M81.0 AGE-RELATED OSTEOPOROSIS WITHOUT CURRENT PATHOLOGICAL FRACTURE: ICD-10-CM

## 2024-07-25 DIAGNOSIS — E78.2 MIXED HYPERLIPIDEMIA: Chronic | ICD-10-CM

## 2024-07-25 DIAGNOSIS — I10 PRIMARY HYPERTENSION: ICD-10-CM

## 2024-07-25 DIAGNOSIS — R73.09 ELEVATED GLUCOSE: ICD-10-CM

## 2024-07-25 DIAGNOSIS — D61.2 APLASTIC ANEMIA SECONDARY TO CHRONIC SYSTEMIC DISEASE: ICD-10-CM

## 2024-07-25 DIAGNOSIS — E04.9 GOITER: ICD-10-CM

## 2024-07-25 DIAGNOSIS — K21.9 GASTROESOPHAGEAL REFLUX DISEASE WITHOUT ESOPHAGITIS: ICD-10-CM

## 2024-07-25 DIAGNOSIS — C34.11 MALIGNANT NEOPLASM OF UPPER LOBE OF RIGHT LUNG: Chronic | ICD-10-CM

## 2024-07-25 DIAGNOSIS — Z00.00 ENCOUNTER FOR MEDICARE ANNUAL WELLNESS EXAM: Primary | ICD-10-CM

## 2024-07-25 DIAGNOSIS — M81.8 OTHER OSTEOPOROSIS WITHOUT CURRENT PATHOLOGICAL FRACTURE: ICD-10-CM

## 2024-07-25 PROBLEM — D64.89 OTHER SPECIFIED ANEMIAS: Status: ACTIVE | Noted: 2024-07-25

## 2024-07-25 RX ORDER — CELECOXIB 200 MG/1
200 CAPSULE ORAL DAILY
Qty: 30 CAPSULE | Refills: 11 | Status: SHIPPED | OUTPATIENT
Start: 2024-07-25

## 2024-07-25 NOTE — PROGRESS NOTES
Raffy Beckham MD        PATIENT NAME: Arti Knowles  : 1945  DATE: 24  MRN: [id number]      Patient Care Team:  Raffy Beckham MD as PCP - General (Internal Medicine)  Raffy Beckham MD Brierre, Joseph E, MD as Consulting Physician (Oncology)  Jamil Monique MD as Consulting Physician (Cardiology)       Billing Provider: Raffy Beckham MD  Level of Service: PR MEDICARE ANNUAL WELLNESS SUBSEQUENT VISIT  Patient PCP Information       Provider PCP Type    Raffy Beckham MD General            Reason for Visit / Chief Complaint: Medicare AWV       Update PCP  Update Chief Complaint         History of Present Illness / Problem Focused Workflow     Arti Knowles presents to the clinic with Medicare AWV     Arti is here for annual wellness exam   Her main complaint is increasing shortness a breath   She says she has had 40% 1 of her lobes resected  She has seen pulmonology recently who has no explanation for shortness a breath   She is under cardiac workup and having a stress test next week.        Review of Systems   Review of Systems   Constitutional: Negative.    HENT: Negative.     Eyes: Negative.    Respiratory:  Positive for shortness of breath.    Cardiovascular: Negative.    Gastrointestinal: Negative.    Endocrine: Negative.    Genitourinary: Negative.    Musculoskeletal: Negative.    Integumentary:  Negative.   Neurological: Negative.    Psychiatric/Behavioral: Negative.          Patient Reported Health Risk Assessment       Medical / Social / Family History     Past Medical History:   Diagnosis Date    Hyperlipidemia        Past Surgical History:   Procedure Laterality Date    APPENDECTOMY      BIOPSY      DILATION AND CURETTAGE OF UTERUS      EYE SURGERY  Cataract  Oct/2017    FRACTURE SURGERY  Replaced Rt shoulder 2003    HERNIA REPAIR  2017    HYSTERECTOMY      JOINT REPLACEMENT  Knees /    KNEE SURGERY      lymph nodes surgery       THYROIDECTOMY, PARTIAL      TOE SURGERY      TONSILLECTOMY      TOTAL SHOULDER ARTHROPLASTY         Social History  Ms. Knowles  reports that she has never smoked. She has never used smokeless tobacco. She reports current alcohol use of about 1.0 standard drink of alcohol per week. She reports that she does not use drugs.    Family History  Ms.'s Knowles  family history includes Alcohol abuse in her son; Arthritis in her maternal grandmother; Cancer in her father and mother; Drug abuse in her son; Heart disease in her father; Mental illness in her son.        Medications and Allergies     Medications  Outpatient Medications Marked as Taking for the 7/25/24 encounter (Office Visit) with Raffy Beckham MD   Medication Sig Dispense Refill    ALPRAZolam (XANAX) 0.25 MG tablet TAKE 1 TABLET (0.25 MG TOTAL) BY MOUTH DAILY AS NEEDED FOR INSOMNIA OR ANXIETY. 30 tablet 5    amLODIPine (NORVASC) 5 MG tablet Take 1 tablet (5 mg total) by mouth once daily. 90 tablet 5    atorvastatin (LIPITOR) 20 MG tablet TAKE 1 TABLET BY MOUTH EVERY DAY 90 tablet 3    buPROPion (WELLBUTRIN XL) 150 MG TB24 tablet TAKE 1 TABLET BY MOUTH EVERY DAY 90 tablet 3    cetirizine (ZYRTEC) 5 MG tablet Take 5 mg by mouth once daily.      ergocalciferol (ERGOCALCIFEROL) 50,000 unit Cap TAKE 1 CAPSULE BY MOUTH ONE TIME PER WEEK 12 capsule 3    furosemide (LASIX) 20 MG tablet Take 20 mg by mouth. prn      Lactobacillus acidophilus 10 billion cell Cap Take 1 tablet by mouth every morning.      losartan (COZAAR) 100 MG tablet TAKE 1 TABLET BY MOUTH EVERY DAY 90 tablet 3    magnesium 30 mg Tab Take 400 mg by mouth.      pantoprazole (PROTONIX) 40 MG tablet TAKE 1 TABLET BY MOUTH EVERY DAY 90 tablet 1    sodium chloride 0.9% SolP 50 mL with nivolumab 40 mg/4 mL Soln 3 mg/kg Inject 3 mg/kg into the vein every 30 days.      vitamin E 1000 UNIT capsule Take 2,000 Units by mouth.         Allergies  Review of patient's allergies indicates:   Allergen Reactions     Sulfa (sulfonamide antibiotics) Itching, Swelling and Other (See Comments)     welts       Physical Examination     Vitals:    07/25/24 1105   BP: 136/72   Pulse: 71   Resp: 16     Physical Exam  Constitutional:       Appearance: Normal appearance.   HENT:      Head: Normocephalic and atraumatic.      Right Ear: Tympanic membrane normal.      Left Ear: Tympanic membrane normal.      Nose: Nose normal.      Mouth/Throat:      Mouth: Mucous membranes are moist.   Eyes:      Extraocular Movements: Extraocular movements intact.      Pupils: Pupils are equal, round, and reactive to light.   Cardiovascular:      Rate and Rhythm: Normal rate and regular rhythm.      Pulses: Normal pulses.   Pulmonary:      Effort: Pulmonary effort is normal.      Breath sounds: Normal breath sounds.   Abdominal:      General: Abdomen is flat. Bowel sounds are normal.      Palpations: Abdomen is soft.   Musculoskeletal:         General: Normal range of motion.      Cervical back: Normal range of motion and neck supple.   Skin:     General: Skin is warm and dry.   Neurological:      General: No focal deficit present.      Mental Status: She is alert and oriented to person, place, and time.   Psychiatric:         Mood and Affect: Mood normal.         Behavior: Behavior normal.               No data to display                  7/25/2024    11:00 AM 6/12/2024     2:40 PM 5/20/2024    10:40 AM 9/13/2023    10:15 AM 6/7/2023     1:40 PM 5/15/2023     8:30 AM 4/26/2023    10:20 AM   Fall Risk Assessment - Outpatient   Mobility Status Ambulatory Ambulatory Ambulatory  Ambulatory Ambulatory Ambulatory   Number of falls 0 0 0 0 0 0 0   Identified as fall risk False False False  False False False                Assessment and Plan (including Health Maintenance)      Problem List  Smart Sets  Document Outside HM   :    Plan:       ICD-10-CM ICD-9-CM   1. Encounter for Medicare annual wellness exam  Z00.00 V70.0   2. Mixed hyperlipidemia  E78.2 272.2    3. Primary hypertension  I10 401.9   4. Malignant neoplasm of upper lobe of right lung  C34.11 162.3   5. Gastroesophageal reflux disease without esophagitis  K21.9 530.81   6. Goiter  E04.9 240.9   7. Other osteoporosis without current pathological fracture  M81.8 733.09   8. 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 Elevated glucose  R73.09 790.29               Health Maintenance Due   Topic Date Due    TETANUS VACCINE  Never done    COVID-19 Vaccine (5 - 2023-24 season) 11/22/2023    Shingles Vaccine (2 of 2) 03/29/2024       Problem List Items Addressed This Visit          Cardiac/Vascular    Hypertensive disorder (Chronic)    Current Assessment & Plan     Blood pressure stable continue medication         Hyperlipidemia (Chronic)    Current Assessment & Plan     Lipid levels good continue medication            Oncology    Malignant neoplasm of upper lobe of right lung (Chronic)    Current Assessment & Plan     In remission she sees her oncologist Dr. Piedra.            Endocrine    Goiter (Chronic)    Current Assessment & Plan     Condition stable            GI    Gastroesophageal reflux disease (Chronic)    Current Assessment & Plan     Stable on medication continue medication            Other    Encounter for Medicare annual wellness exam - Primary    Current Assessment & Plan     Discussed results of laboratory and examination.          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         Advance Care Planning     Date: 07/25/2024  Patient did not wish or was not able to name a surrogate decision maker or provide an Advance Care Plan.           Opioid Screening: Patient medication list reviewed, patient is not taking prescription opioids. Patient is not using additional opioids than prescribed. Patient is at low risk of substance abuse based on this opioid use history.        Signature:  Raffy Rosa MD  [facility]  [address] [place] [id number]    Date of encounter: 7/25/24    Follow up in about 1 year (around 7/25/2025) for Medicare Wellness with labs. In addition to their scheduled follow up, the patient has also been instructed to follow up on as needed basis.

## 2024-08-13 DIAGNOSIS — F41.9 ANXIETY: ICD-10-CM

## 2024-08-14 RX ORDER — ALPRAZOLAM 0.25 MG/1
0.25 TABLET ORAL DAILY PRN
Qty: 30 TABLET | Refills: 0 | Status: SHIPPED | OUTPATIENT
Start: 2024-08-14

## 2024-09-05 DIAGNOSIS — I10 PRIMARY HYPERTENSION: ICD-10-CM

## 2024-09-05 DIAGNOSIS — E78.5 HYPERLIPIDEMIA, UNSPECIFIED HYPERLIPIDEMIA TYPE: ICD-10-CM

## 2024-09-05 RX ORDER — LOSARTAN POTASSIUM 100 MG/1
100 TABLET ORAL
Qty: 90 TABLET | Refills: 3 | Status: SHIPPED | OUTPATIENT
Start: 2024-09-05

## 2024-09-05 RX ORDER — ATORVASTATIN CALCIUM 20 MG/1
20 TABLET, FILM COATED ORAL
Qty: 90 TABLET | Refills: 3 | Status: SHIPPED | OUTPATIENT
Start: 2024-09-05

## 2024-09-16 PROBLEM — Z09 HOSPITAL DISCHARGE FOLLOW-UP: Status: RESOLVED | Noted: 2024-06-12 | Resolved: 2024-09-16

## 2024-09-18 ENCOUNTER — OFFICE VISIT (OUTPATIENT)
Dept: INTERNAL MEDICINE | Facility: CLINIC | Age: 79
End: 2024-09-18
Payer: MEDICARE

## 2024-09-18 VITALS
BODY MASS INDEX: 43.78 KG/M2 | DIASTOLIC BLOOD PRESSURE: 78 MMHG | SYSTOLIC BLOOD PRESSURE: 132 MMHG | HEIGHT: 60 IN | OXYGEN SATURATION: 98 % | HEART RATE: 96 BPM | WEIGHT: 223 LBS

## 2024-09-18 DIAGNOSIS — T50.901A ACCIDENTAL MEDICATION ERROR, INITIAL ENCOUNTER: Primary | ICD-10-CM

## 2024-09-18 DIAGNOSIS — M79.89 SWELLING OF LOWER EXTREMITY: ICD-10-CM

## 2024-09-18 PROCEDURE — 99214 OFFICE O/P EST MOD 30 MIN: CPT | Mod: ,,,

## 2024-09-18 NOTE — ASSESSMENT & PLAN NOTE
-S/p recent hospital observation   -treated with IV gentle hydration   -discussed medications safety precautions
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
## 2024-09-18 NOTE — ASSESSMENT & PLAN NOTE
-established with vascular with upcoming diagnostics, follow   -currently on amlodipine, discontinue due to potential side effects of leg swelling  -also on furosemide, continue  -low-sodium diet   -elevate extremities

## 2024-09-19 ENCOUNTER — HOSPITAL ENCOUNTER (OUTPATIENT)
Dept: RADIOLOGY | Facility: HOSPITAL | Age: 79
Discharge: HOME | End: 2024-09-19
Attending: INTERNAL MEDICINE
Payer: MEDICARE

## 2024-09-19 DIAGNOSIS — M81.0 AGE-RELATED OSTEOPOROSIS WITHOUT CURRENT PATHOLOGICAL FRACTURE: ICD-10-CM

## 2024-09-19 PROCEDURE — 77080 DXA BONE DENSITY AXIAL: CPT | Mod: XU,TC

## 2024-09-19 PROCEDURE — 77081 DXA BONE DENSITY APPENDICULR: CPT | Mod: TC

## 2024-09-20 ENCOUNTER — TELEPHONE (OUTPATIENT)
Dept: INTERNAL MEDICINE | Facility: CLINIC | Age: 79
End: 2024-09-20
Payer: MEDICARE

## 2024-09-20 PROBLEM — M85.89 OSTEOPENIA OF MULTIPLE SITES: Chronic | Status: ACTIVE | Noted: 2024-09-20

## 2024-09-20 NOTE — TELEPHONE ENCOUNTER
Spoke with patient and she was given results and recommendations of DEXA Scan. Verbalized understanding.

## 2024-09-20 NOTE — TELEPHONE ENCOUNTER
----- Message from MILO Balderas sent at 9/20/2024  6:03 AM CDT -----  Please inform patient I have reviewed her bone density, which indicates osteopenia (the precursor to osteoporosis).  Recommendations are currently to incorporate, if she is not already on, over the counter supplementation with calcium 500 mg to 600 mg twice daily and vitamin D 800 units twice daily.  As well as incorporate some form of routine weightbearing exercise such as walking and cycling to stimulate bone health.

## 2024-09-20 NOTE — PROGRESS NOTES
Please inform patient I have reviewed her bone density, which indicates osteopenia (the precursor to osteoporosis).  Recommendations are currently to incorporate, if she is not already on, over the counter supplementation with calcium 500 mg to 600 mg twice daily and vitamin D 800 units twice daily.  As well as incorporate some form of routine weightbearing exercise such as walking and cycling to stimulate bone health.

## 2024-09-21 DIAGNOSIS — E55.9 VITAMIN D DEFICIENCY: ICD-10-CM

## 2024-09-23 RX ORDER — ERGOCALCIFEROL 1.25 MG/1
CAPSULE ORAL
Qty: 12 CAPSULE | Refills: 1 | Status: SHIPPED | OUTPATIENT
Start: 2024-09-23

## 2024-09-30 ENCOUNTER — TELEPHONE (OUTPATIENT)
Dept: INTERNAL MEDICINE | Facility: CLINIC | Age: 79
End: 2024-09-30
Payer: MEDICARE

## 2024-10-07 ENCOUNTER — CLINICAL SUPPORT (OUTPATIENT)
Dept: INTERNAL MEDICINE | Facility: CLINIC | Age: 79
End: 2024-10-07
Payer: MEDICARE

## 2024-10-07 DIAGNOSIS — I10 PRIMARY HYPERTENSION: Primary | ICD-10-CM

## 2024-10-09 VITALS — SYSTOLIC BLOOD PRESSURE: 118 MMHG | DIASTOLIC BLOOD PRESSURE: 70 MMHG

## 2024-10-09 NOTE — PROGRESS NOTES
Pt was seen today for a blood pressure f/u. Pt doesn't report any symptoms at this time. Visit completed per Enriqueta SMITH

## 2024-10-18 DIAGNOSIS — K21.9 GASTRO-ESOPHAGEAL REFLUX DISEASE WITHOUT ESOPHAGITIS: ICD-10-CM

## 2024-10-21 ENCOUNTER — TELEPHONE (OUTPATIENT)
Dept: INTERNAL MEDICINE | Facility: CLINIC | Age: 79
End: 2024-10-21
Payer: MEDICARE

## 2024-10-21 DIAGNOSIS — M79.89 SWELLING OF LOWER EXTREMITY: Primary | ICD-10-CM

## 2024-10-21 RX ORDER — FUROSEMIDE 20 MG/1
10 TABLET ORAL DAILY
Qty: 15 TABLET | Refills: 11 | Status: SHIPPED | OUTPATIENT
Start: 2024-10-21

## 2024-10-21 RX ORDER — PANTOPRAZOLE SODIUM 40 MG/1
40 TABLET, DELAYED RELEASE ORAL
Qty: 90 TABLET | Refills: 5 | Status: SHIPPED | OUTPATIENT
Start: 2024-10-21

## 2024-10-21 NOTE — TELEPHONE ENCOUNTER
----- Message from Moon sent at 10/21/2024  9:48 AM CDT -----  Who Called: Arti Knowles    Caller is requesting assistance/information from provider's office.    Symptoms (please be specific):    How long has patient had these symptoms:    List of preferred pharmacies on file (remove unneeded): [unfilled]  If different, enter pharmacy into here including location and phone number: [facility] #[id number] - [place], [place] - [address]        Patient's Preferred Phone Number on File: [phone]   Best Call Back Number, if different:  Additional Information: pt called to determine if provider will prescribed Lasix 10 mg , prev prescribed by different provider but was discontinued  . Please follow up

## 2024-10-21 NOTE — TELEPHONE ENCOUNTER
Pt was taking Lasix 20 mg previously but was told to stop taking due to sodium being low y Oncology, now they told her to take half. pt called to determine if ryder will prescribed Lasix 10 mg , prev prescribed by different provider but was discontinued, please advise

## 2024-10-28 PROBLEM — Z00.00 ENCOUNTER FOR MEDICARE ANNUAL WELLNESS EXAM: Status: RESOLVED | Noted: 2024-07-24 | Resolved: 2024-10-28

## 2025-01-14 ENCOUNTER — OFFICE VISIT (OUTPATIENT)
Dept: INTERNAL MEDICINE | Facility: CLINIC | Age: 80
End: 2025-01-14
Payer: MEDICARE

## 2025-01-14 VITALS
OXYGEN SATURATION: 98 % | HEART RATE: 75 BPM | WEIGHT: 207.81 LBS | SYSTOLIC BLOOD PRESSURE: 136 MMHG | BODY MASS INDEX: 40.8 KG/M2 | HEIGHT: 60 IN | DIASTOLIC BLOOD PRESSURE: 72 MMHG

## 2025-01-14 DIAGNOSIS — R30.0 DYSURIA: ICD-10-CM

## 2025-01-14 DIAGNOSIS — R53.1 GENERALIZED WEAKNESS: Primary | ICD-10-CM

## 2025-01-14 DIAGNOSIS — E66.813 CLASS 3 SEVERE OBESITY DUE TO EXCESS CALORIES WITHOUT SERIOUS COMORBIDITY WITH BODY MASS INDEX (BMI) OF 40.0 TO 44.9 IN ADULT: ICD-10-CM

## 2025-01-14 DIAGNOSIS — C43.9 METASTATIC MELANOMA: ICD-10-CM

## 2025-01-14 DIAGNOSIS — E66.01 CLASS 3 SEVERE OBESITY DUE TO EXCESS CALORIES WITHOUT SERIOUS COMORBIDITY WITH BODY MASS INDEX (BMI) OF 40.0 TO 44.9 IN ADULT: ICD-10-CM

## 2025-01-14 DIAGNOSIS — R11.0 NAUSEA: ICD-10-CM

## 2025-01-14 DIAGNOSIS — C34.11 MALIGNANT NEOPLASM OF UPPER LOBE OF RIGHT LUNG: ICD-10-CM

## 2025-01-14 LAB
BACTERIA #/AREA URNS AUTO: ABNORMAL /HPF
BILIRUB UR QL STRIP.AUTO: NEGATIVE
CLARITY UR: CLEAR
COLOR UR AUTO: COLORLESS
GLUCOSE UR QL STRIP: NORMAL
HGB UR QL STRIP: NEGATIVE
KETONES UR QL STRIP: NEGATIVE
LEUKOCYTE ESTERASE UR QL STRIP: NEGATIVE
MUCOUS THREADS URNS QL MICRO: ABNORMAL /LPF
NITRITE UR QL STRIP: NEGATIVE
PH UR STRIP: 7.5 [PH]
PROT UR QL STRIP: NEGATIVE
RBC #/AREA URNS AUTO: ABNORMAL /HPF
SP GR UR STRIP.AUTO: 1.01 (ref 1–1.03)
SQUAMOUS #/AREA URNS LPF: ABNORMAL /HPF
UROBILINOGEN UR STRIP-ACNC: NORMAL
WBC #/AREA URNS AUTO: ABNORMAL /HPF

## 2025-01-14 PROCEDURE — 81015 MICROSCOPIC EXAM OF URINE: CPT | Performed by: NURSE PRACTITIONER

## 2025-01-14 PROCEDURE — 99214 OFFICE O/P EST MOD 30 MIN: CPT | Mod: ,,, | Performed by: NURSE PRACTITIONER

## 2025-01-14 NOTE — ASSESSMENT & PLAN NOTE
Since patient is c/o generalized weakness and severe nausea, she will go to ED for further evaluation and IV fluids.

## 2025-01-14 NOTE — PROGRESS NOTES
Internal Medicine    Patient Name:  Arti Knowles   Patient ID: [id number]     Chief Complaint: Cystitis      HPI:     Arti Knowles is a 79 y.o. female, known to Dr Beckham, is here today for requested visit.  Medical comorbidities include hypertension, HLD, osteoporosis, anxiety, lung cancer, and melanoma of right toe.  She was seen in urgent care on 01/11/2025 with complaints of urinary frequency  x3 days.  UA positive for blood and leukocytes.  She was prescribed Cefdinir on 01/11/2025.  Returned to urgent care yesterday due to nausea - given prescription for Zofran.  Presents today with complaints of generalized weakness, nausea, and decreased appetite.  States she is not able to eat or drink due to severe nausea.  Denies fever, chills, flank pain.    Last AWV: 07/25/2024         Past Medical History:   Diagnosis Date    Hyperlipidemia     Osteopenia of multiple sites 09/20/2024        Past Surgical History:   Procedure Laterality Date    APPENDECTOMY      BIOPSY      DILATION AND CURETTAGE OF UTERUS      EYE SURGERY  Cataract  Oct/Nov 2017    FRACTURE SURGERY  Replaced Rt shoulder 2003    HERNIA REPAIR  2017    HYSTERECTOMY      JOINT REPLACEMENT  Knees 2015/2016    KNEE SURGERY      lymph nodes surgery      THYROIDECTOMY, PARTIAL      TOE SURGERY      TONSILLECTOMY      TOTAL SHOULDER ARTHROPLASTY          Social History     Tobacco Use    Smoking status: Never    Smokeless tobacco: Never   Substance and Sexual Activity    Alcohol use: Yes     Alcohol/week: 1.0 standard drink of alcohol     Types: 1 Drinks containing 0.5 oz of alcohol per week    Drug use: Never    Sexual activity: Not Currently     Partners: Male     Birth control/protection: I.U.D., Spermicide        Current Outpatient Medications   Medication Instructions    ALPRAZolam (XANAX) 0.25 mg, Oral, Daily PRN    atorvastatin (LIPITOR) 20 mg, Oral    buPROPion (WELLBUTRIN XL) 150 mg, Oral    celecoxib (CELEBREX) 200 mg, Oral, Daily     cetirizine (ZYRTEC) 5 mg, Daily    ergocalciferol (ERGOCALCIFEROL) 50,000 unit Cap TAKE 1 CAPSULE BY MOUTH ONE TIME PER WEEK    furosemide (LASIX) 20 mg    furosemide (LASIX) 10 mg, Oral, Daily    Lactobacillus acidophilus 10 billion cell Cap 1 tablet, Every morning    losartan (COZAAR) 100 mg, Oral    magnesium 400 mg    pantoprazole (PROTONIX) 40 mg, Oral    vitamin E 2,000 Units       Review of patient's allergies indicates:   Allergen Reactions    Sulfa (sulfonamide antibiotics) Itching, Swelling and Other (See Comments)     [facility]        Patient Care Team:  Raffy Beckham MD as PCP - General (Internal Medicine)  Raffy Beckham MD Brierre, Joseph E, MD as Consulting Physician (Oncology)  Jamil Monique MD as Consulting Physician (Cardiology)     Subjective:     Review of Systems   Constitutional:  Negative for appetite change, chills, diaphoresis and fever.   HENT:  Negative for ear pain, sinus pain and sore throat.    Eyes:  Negative for pain and visual disturbance.   Respiratory:  Negative for cough, shortness of breath and wheezing.    Cardiovascular:  Negative for chest pain, palpitations and leg swelling.   Gastrointestinal:  Positive for nausea and vomiting. Negative for abdominal pain, constipation and diarrhea.   Endocrine: Negative for cold intolerance.   Genitourinary:  Negative for difficulty urinating, dysuria, frequency and hematuria.   Musculoskeletal:  Negative for arthralgias and myalgias.   Skin:  Negative for color change and rash.   Allergic/Immunologic: Negative.    Neurological:  Positive for weakness (Generalized weakness). Negative for dizziness, syncope, light-headedness and numbness.   Hematological: Negative.    Psychiatric/Behavioral: Negative.  Negative for dysphoric mood. The patient is not nervous/anxious.    All other systems reviewed and are negative.      Objective:     Visit Vitals  /72 (BP Location: Left arm, Patient Position: Sitting)   Pulse 75   Ht 5'  (1.524 m)   Wt 94.3 kg (207 lb 12.8 oz)   SpO2 98%   BMI 40.58 kg/m²       Physical Exam  Vitals and nursing note reviewed.   Constitutional:       General: She is not in acute distress.     Appearance: She is not ill-appearing.   HENT:      Head: Normocephalic and atraumatic.      Mouth/Throat:      Mouth: Mucous membranes are moist.      Pharynx: Oropharynx is clear.   Eyes:      General: No scleral icterus.     Extraocular Movements: Extraocular movements intact.      Conjunctiva/sclera: Conjunctivae normal.      Pupils: Pupils are equal, round, and reactive to light.   Neck:      Vascular: No carotid bruit.   Cardiovascular:      Rate and Rhythm: Normal rate and regular rhythm.      Heart sounds: Normal heart sounds. No murmur heard.     No friction rub. No gallop.   Pulmonary:      Effort: Pulmonary effort is normal. No respiratory distress.      Breath sounds: Normal breath sounds. No wheezing, rhonchi or rales.   Abdominal:      General: Bowel sounds are increased. There is no distension.      Palpations: Abdomen is soft. There is no mass.      Tenderness: There is no abdominal tenderness. There is no right CVA tenderness, left CVA tenderness, guarding or rebound.   Musculoskeletal:         General: Normal range of motion.      Cervical back: Normal range of motion and neck supple.   Lymphadenopathy:      Cervical: No cervical adenopathy.   Skin:     General: Skin is warm and dry.      Capillary Refill: Capillary refill takes less than 2 seconds.   Neurological:      General: No focal deficit present.      Mental Status: She is alert and oriented to person, place, and time.   Psychiatric:         Mood and Affect: Mood normal.         Behavior: Behavior normal.         Labs Reviewed:     Chemistry:  Lab Results   Component Value Date     05/20/2024    K 4.6 05/20/2024    BUN 17.3 05/20/2024    CREATININE 0.72 05/20/2024    EGFRNORACEVR >60 05/20/2024    GLUCOSE 121 (H) 05/20/2024    CALCIUM 9.2  05/20/2024    ALKPHOS 80 05/20/2024    LABPROT 6.5 05/20/2024    ALBUMIN 3.3 (L) 05/20/2024    BILIDIR 0.3 03/22/2021    IBILI 0.40 03/22/2021    AST 14 05/20/2024    ALT 26 05/20/2024    AVMQTHUZ43RJ 39 07/19/2024    TSH 0.940 09/03/2024    ZCKUAQ4EKWD 0.98 05/07/2021        Hematology:  Lab Results   Component Value Date    WBC 5.14 07/19/2024    HGB 11.3 (L) 07/19/2024    HCT 34.4 (L) 07/19/2024     07/19/2024       Lipid Panel:  Lab Results   Component Value Date    CHOL 152 07/19/2024    HDL 48 07/19/2024    LDL 93.00 07/19/2024    TRIG 56 07/19/2024    TOTALCHOLEST 3 07/19/2024        Urine:  Lab Results   Component Value Date    APPEARANCEUA Clear 07/19/2024    SGUA 1.015 07/19/2024    PROTEINUA Negative 07/19/2024    KETONESUA Negative 07/19/2024    LEUKOCYTESUR Negative 07/19/2024    RBCUA 0-5 07/19/2024    WBCUA 0-5 07/19/2024    BACTERIA None Seen 07/19/2024    SQEPUA Trace 07/19/2024        Assessment:       ICD-10-CM ICD-9-CM   1. Generalized weakness  R53.1 780.79   2. Nausea  R11.0 787.02   3. Dysuria  R30.0 788.1   4. Class 3 severe obesity due to excess calories without serious comorbidity with body mass index (BMI) of 40.0 to 44.9 in adult  E66.813 278.01    E66.01 V85.41    Z68.41    5. Malignant neoplasm of upper lobe of right lung  C34.11 162.3   6. Metastatic melanoma  C43.9 172.9        Plan:     1. Generalized weakness  Assessment & Plan:  Since patient is c/o generalized weakness and severe nausea, she will go to ED for further evaluation and IV fluids.      2. Nausea  Assessment & Plan:  Previously prescribed Zofran, but reports no significant improvement.  Advance diet as tolerated       3. Dysuria  Assessment & Plan:  Seen in urgent care on 01/01/2025.  Started on cefdinir.    Reports symptoms have worsened.    Denies fever, chills, flank pain.  Repeat UA with reflex to culture today.    Orders:  -     Cancel: Urinalysis, Reflex to Urine Culture; Future; Expected date:  01/14/2025  -     Urinalysis, Reflex to Urine Culture; Future; Expected date: 01/14/2025    4. Class 3 severe obesity due to excess calories without serious comorbidity with body mass index (BMI) of 40.0 to 44.9 in adult  Assessment & Plan:  Body mass index is 40.58 kg/m²..  Goal BMI <30.  Recommend exercise 5 times a week for 30 minutes per day.  Avoid soda, simple sugars, excessive rice, potatoes or bread. Limit fast foods and fried foods.  Choose complex carbs in moderation (example: green vegetables, beans, oatmeal).   Eat plenty of fresh fruits and vegetables with lean meats daily.  Do not skip meals. Eat a balanced portion size.  Avoid fad diets. Consider permanent healthy lifestyle changes.      5. Malignant neoplasm of upper lobe of right lung  Assessment & Plan:  Followed by oncology (Dr Piedra)      6. Metastatic melanoma  Assessment & Plan:  Followed by oncology (Dr. Piedra)  Scheduled to start treatments         During visit, patient made decision to go to ED for further workup and possible IV fluids.  She drove herself to the appointment today.  She declines ambulance.  States she will go home and have her daughter bring her to ED.      2330 Update: Spoke with patient and updated on negative UA results.  She stated she was in the ED being evaluated.          Follow up for Previously scheduled and PRN if need. In addition to their scheduled follow up, the patient has also been instructed to follow up on as needed basis.       Future Appointments   Date Time Provider Department Center   7/28/2025  1:20 PM Raffy Beckham MD [place] [id number]          MILO eKlley

## 2025-01-14 NOTE — ASSESSMENT & PLAN NOTE
Body mass index is 40.58 kg/m²..  Goal BMI <30.  Recommend exercise 5 times a week for 30 minutes per day.  Avoid soda, simple sugars, excessive rice, potatoes or bread. Limit fast foods and fried foods.  Choose complex carbs in moderation (example: green vegetables, beans, oatmeal).   Eat plenty of fresh fruits and vegetables with lean meats daily.  Do not skip meals. Eat a balanced portion size.  Avoid fad diets. Consider permanent healthy lifestyle changes.

## 2025-01-14 NOTE — ASSESSMENT & PLAN NOTE
Seen in urgent care on 01/01/2025.  Started on cefdinir.    Reports symptoms have worsened.    Denies fever, chills, flank pain.  Repeat UA with reflex to culture today.

## 2025-01-17 DIAGNOSIS — F32.A DEPRESSION, UNSPECIFIED DEPRESSION TYPE: ICD-10-CM

## 2025-01-17 RX ORDER — BUPROPION HYDROCHLORIDE 150 MG/1
150 TABLET ORAL
Qty: 90 TABLET | Refills: 2 | Status: SHIPPED | OUTPATIENT
Start: 2025-01-17

## 2025-01-24 ENCOUNTER — TELEPHONE (OUTPATIENT)
Dept: INTERNAL MEDICINE | Facility: CLINIC | Age: 80
End: 2025-01-24
Payer: MEDICARE

## 2025-01-27 ENCOUNTER — TELEPHONE (OUTPATIENT)
Dept: INTERNAL MEDICINE | Facility: CLINIC | Age: 80
End: 2025-01-27
Payer: MEDICARE

## 2025-01-27 DIAGNOSIS — R26.89 BALANCE PROBLEM: ICD-10-CM

## 2025-01-27 DIAGNOSIS — R53.1 GENERALIZED WEAKNESS: Primary | ICD-10-CM

## 2025-01-27 NOTE — TELEPHONE ENCOUNTER
Cancelled patient appointment for tomorrow per Dr. Beckham. Patient wanted PT order to [facility]

## 2025-02-19 ENCOUNTER — TELEPHONE (OUTPATIENT)
Dept: INTERNAL MEDICINE | Facility: CLINIC | Age: 80
End: 2025-02-19
Payer: MEDICARE

## 2025-02-19 NOTE — TELEPHONE ENCOUNTER
Spoke with pt regarding symptoms pt is experiencing fever, nausea, fatigue, and slight headache for 1 week, scheduled pt for OV 02/20/2025

## 2025-02-20 ENCOUNTER — LAB VISIT (OUTPATIENT)
Dept: LAB | Facility: HOSPITAL | Age: 80
End: 2025-02-20
Attending: INTERNAL MEDICINE
Payer: MEDICARE

## 2025-02-20 ENCOUNTER — OFFICE VISIT (OUTPATIENT)
Dept: INTERNAL MEDICINE | Facility: CLINIC | Age: 80
End: 2025-02-20
Payer: MEDICARE

## 2025-02-20 VITALS
OXYGEN SATURATION: 98 % | RESPIRATION RATE: 16 BRPM | SYSTOLIC BLOOD PRESSURE: 130 MMHG | BODY MASS INDEX: 41.43 KG/M2 | HEIGHT: 60 IN | HEART RATE: 74 BPM | WEIGHT: 211 LBS | DIASTOLIC BLOOD PRESSURE: 70 MMHG

## 2025-02-20 DIAGNOSIS — R11.0 NAUSEA: ICD-10-CM

## 2025-02-20 DIAGNOSIS — R53.82 CHRONIC FATIGUE: ICD-10-CM

## 2025-02-20 DIAGNOSIS — C79.9 METASTATIC MALIGNANT NEOPLASM, UNSPECIFIED SITE: ICD-10-CM

## 2025-02-20 DIAGNOSIS — R50.9 FEVER AND CHILLS: ICD-10-CM

## 2025-02-20 DIAGNOSIS — R73.09 ELEVATED GLUCOSE: ICD-10-CM

## 2025-02-20 DIAGNOSIS — R11.0 NAUSEA: Primary | ICD-10-CM

## 2025-02-20 LAB
ALBUMIN SERPL-MCNC: 3.3 G/DL (ref 3.4–4.8)
ALBUMIN/GLOB SERPL: 1.2 RATIO (ref 1.1–2)
ALP SERPL-CCNC: 83 UNIT/L (ref 40–150)
ALT SERPL-CCNC: 30 UNIT/L (ref 0–55)
ANION GAP SERPL CALC-SCNC: 8 MEQ/L
AST SERPL-CCNC: 40 UNIT/L (ref 5–34)
BACTERIA #/AREA URNS AUTO: ABNORMAL /HPF
BASOPHILS # BLD AUTO: 0.05 X10(3)/MCL
BASOPHILS NFR BLD AUTO: 1 %
BILIRUB SERPL-MCNC: 0.4 MG/DL
BILIRUB UR QL STRIP.AUTO: NEGATIVE
BUN SERPL-MCNC: 9.1 MG/DL (ref 9.8–20.1)
CALCIUM SERPL-MCNC: 8.7 MG/DL (ref 8.4–10.2)
CHLORIDE SERPL-SCNC: 98 MMOL/L (ref 98–107)
CLARITY UR: ABNORMAL
CO2 SERPL-SCNC: 26 MMOL/L (ref 23–31)
COLOR UR AUTO: COLORLESS
CREAT SERPL-MCNC: 0.73 MG/DL (ref 0.55–1.02)
CREAT/UREA NIT SERPL: 12
EOSINOPHIL # BLD AUTO: 0.11 X10(3)/MCL (ref 0–0.9)
EOSINOPHIL NFR BLD AUTO: 2.1 %
ERYTHROCYTE [DISTWIDTH] IN BLOOD BY AUTOMATED COUNT: 14.5 % (ref 11.5–17)
EST. AVERAGE GLUCOSE BLD GHB EST-MCNC: 108.3 MG/DL
GFR SERPLBLD CREATININE-BSD FMLA CKD-EPI: >60 ML/MIN/1.73/M2
GLOBULIN SER-MCNC: 2.8 GM/DL (ref 2.4–3.5)
GLUCOSE SERPL-MCNC: 110 MG/DL (ref 82–115)
GLUCOSE UR QL STRIP: NORMAL
HBA1C MFR BLD: 5.4 %
HCT VFR BLD AUTO: 27.7 % (ref 37–47)
HGB BLD-MCNC: 9.1 G/DL (ref 12–16)
HGB UR QL STRIP: ABNORMAL
IMM GRANULOCYTES # BLD AUTO: 0.06 X10(3)/MCL (ref 0–0.04)
IMM GRANULOCYTES NFR BLD AUTO: 1.1 %
KETONES UR QL STRIP: NEGATIVE
LEUKOCYTE ESTERASE UR QL STRIP: 500
LYMPHOCYTES # BLD AUTO: 1 X10(3)/MCL (ref 0.6–4.6)
LYMPHOCYTES NFR BLD AUTO: 19 %
MCH RBC QN AUTO: 27.7 PG (ref 27–31)
MCHC RBC AUTO-ENTMCNC: 32.9 G/DL (ref 33–36)
MCV RBC AUTO: 84.2 FL (ref 80–94)
MONOCYTES # BLD AUTO: 0.32 X10(3)/MCL (ref 0.1–1.3)
MONOCYTES NFR BLD AUTO: 6.1 %
NEUTROPHILS # BLD AUTO: 3.71 X10(3)/MCL (ref 2.1–9.2)
NEUTROPHILS NFR BLD AUTO: 70.7 %
NITRITE UR QL STRIP: NEGATIVE
NRBC BLD AUTO-RTO: 0 %
PH UR STRIP: 6.5 [PH]
PLATELET # BLD AUTO: 213 X10(3)/MCL (ref 130–400)
PMV BLD AUTO: 9.5 FL (ref 7.4–10.4)
POTASSIUM SERPL-SCNC: 4.3 MMOL/L (ref 3.5–5.1)
PROT SERPL-MCNC: 6.1 GM/DL (ref 5.8–7.6)
PROT UR QL STRIP: NEGATIVE
RBC # BLD AUTO: 3.29 X10(6)/MCL (ref 4.2–5.4)
RBC #/AREA URNS AUTO: ABNORMAL /HPF
SODIUM SERPL-SCNC: 132 MMOL/L (ref 136–145)
SP GR UR STRIP.AUTO: 1 (ref 1–1.03)
SQUAMOUS #/AREA URNS LPF: ABNORMAL /HPF
UROBILINOGEN UR STRIP-ACNC: NORMAL
WBC # BLD AUTO: 5.25 X10(3)/MCL (ref 4.5–11.5)
WBC #/AREA URNS AUTO: >100 /HPF

## 2025-02-20 PROCEDURE — 81001 URINALYSIS AUTO W/SCOPE: CPT

## 2025-02-20 PROCEDURE — 36415 COLL VENOUS BLD VENIPUNCTURE: CPT

## 2025-02-20 PROCEDURE — 83036 HEMOGLOBIN GLYCOSYLATED A1C: CPT

## 2025-02-20 PROCEDURE — 85025 COMPLETE CBC W/AUTO DIFF WBC: CPT

## 2025-02-20 PROCEDURE — 80053 COMPREHEN METABOLIC PANEL: CPT

## 2025-02-20 RX ORDER — ONDANSETRON 4 MG/1
4 TABLET, FILM COATED ORAL EVERY 8 HOURS PRN
COMMUNITY
Start: 2025-02-14 | End: 2025-02-21

## 2025-02-20 NOTE — PROGRESS NOTES
Raffy Rosa MD        PATIENT NAME: Arti Knowles  : 1945  DATE: 25  MRN: [id number]      Billing Provider: Raffy Rosa MD  Level of Service: ND OFFICE/OUTPT VISIT, EST, LEVL IV, 30-39 MIN  Patient PCP Information       Provider PCP Type    Raffy Rosa MD General            Reason for Visit / Chief Complaint: Fever (Patient reports that she has had symptoms for 1 week.), Fatigue, and Nausea       Update PCP  Update Chief Complaint         History of Present Illness / Problem Focused Workflow     Arti Knowles presents to the clinic with Fever (Patient reports that she has had symptoms for 1 week.), Fatigue, and Nausea     Kristen is here for issue she has been having this week   For the last few she has had nausea stomach upset fatigue and low-grade fever   She has recently in the hospital for hyponatremia   She is under chemotherapy with Dr. Piedra.        Review of Systems   Review of Systems   Constitutional:  Positive for fever.   HENT: Negative.     Eyes: Negative.    Respiratory: Negative.     Cardiovascular: Negative.    Gastrointestinal:  Positive for nausea.   Endocrine: Negative.    Genitourinary: Negative.    Musculoskeletal: Negative.    Integumentary:  Negative.   Neurological: Negative.    Psychiatric/Behavioral: Negative.          Medical / Social / Family History     Past Medical History:   Diagnosis Date    Hyperlipidemia     Osteopenia of multiple sites 2024       Past Surgical History:   Procedure Laterality Date    APPENDECTOMY  2016    Burst and I was septic.    BIOPSY      DILATION AND CURETTAGE OF UTERUS      EYE SURGERY  Cataract  Oct/2017    FRACTURE SURGERY  Replaced Rt shoulder 2003    HERNIA REPAIR  2017    HYSTERECTOMY      JOINT REPLACEMENT  Knees     KNEE SURGERY      lymph nodes surgery      THYROIDECTOMY, PARTIAL      TOE SURGERY      TONSILLECTOMY      TOTAL SHOULDER ARTHROPLASTY         Social  History  Ms. Knowles  reports that she has never smoked. She has never used smokeless tobacco. She reports current alcohol use of about 1.0 standard drink of alcohol per week. She reports that she does not use drugs.    Family History  Ms.'s Knowles  family history includes Alcohol abuse in her son and son; Arthritis in her maternal grandmother; Cancer in her father and mother; Drug abuse in her son and son; Heart disease in her father; Mental illness in her son and son.    Medications and Allergies     Medications  Outpatient Medications Marked as Taking for the 2/20/25 encounter (Office Visit) with Raffy Beckham MD   Medication Sig Dispense Refill    ALPRAZolam (XANAX) 0.25 MG tablet TAKE 1 TABLET (0.25 MG TOTAL) BY MOUTH DAILY AS NEEDED FOR INSOMNIA OR ANXIETY. 30 tablet 0    atorvastatin (LIPITOR) 20 MG tablet TAKE 1 TABLET BY MOUTH EVERY DAY 90 tablet 3    buPROPion (WELLBUTRIN XL) 150 MG TB24 tablet TAKE 1 TABLET BY MOUTH EVERY DAY 90 tablet 2    cetirizine (ZYRTEC) 5 MG tablet Take 5 mg by mouth once daily.      ergocalciferol (ERGOCALCIFEROL) 50,000 unit Cap TAKE 1 CAPSULE BY MOUTH ONE TIME PER WEEK 12 capsule 1    Lactobacillus acidophilus 10 billion cell Cap Take 1 tablet by mouth every morning.      losartan (COZAAR) 100 MG tablet TAKE 1 TABLET BY MOUTH EVERY DAY 90 tablet 3    magnesium 30 mg Tab Take 400 mg by mouth.      ondansetron (ZOFRAN) 4 MG tablet Take 4 mg by mouth every 8 (eight) hours as needed.      pantoprazole (PROTONIX) 40 MG tablet TAKE 1 TABLET BY MOUTH EVERY DAY 90 tablet 5    vitamin E 1000 UNIT capsule Take 2,000 Units by mouth.         Allergies  Review of patient's allergies indicates:   Allergen Reactions    Sulfa (sulfonamide antibiotics) Itching, Swelling and Other (See Comments)     eileen       Physical Examination     Vitals:    02/20/25 1408   BP: 130/70   Pulse: 74   Resp: 16     Physical Exam  Constitutional:       Appearance: Normal appearance.      Comments: She is  very immobile and very overweight she does not look sick or toxic   HENT:      Head: Normocephalic and atraumatic.      Right Ear: Tympanic membrane normal.      Left Ear: Tympanic membrane normal.      Nose: Nose normal.      Mouth/Throat:      Mouth: Mucous membranes are moist.   Eyes:      Extraocular Movements: Extraocular movements intact.      Pupils: Pupils are equal, round, and reactive to light.   Cardiovascular:      Rate and Rhythm: Normal rate and regular rhythm.      Pulses: Normal pulses.   Pulmonary:      Effort: Pulmonary effort is normal.      Breath sounds: Normal breath sounds.   Abdominal:      General: Abdomen is flat. Bowel sounds are normal.      Palpations: Abdomen is soft.   Musculoskeletal:         General: Normal range of motion.      Cervical back: Normal range of motion and neck supple.   Skin:     General: Skin is warm and dry.   Neurological:      General: No focal deficit present.      Mental Status: She is alert and oriented to person, place, and time.   Psychiatric:         Mood and Affect: Mood normal.         Behavior: Behavior normal.          Assessment and Plan (including Health Maintenance)      Problem List  Smart Sets  Document Outside HM   :    Plan:    ICD-10-CM ICD-9-CM   1. Nausea  R11.0 787.02   2. Pyrexia of unknown origin following delivery  O86.4 672.00   3. Fever and chills  R50.9 780.60   4. Chronic fatigue  R53.82 780.79   5. Metastatic malignant neoplasm, unspecified site  C79.9 199.1       Problem List Items Addressed This Visit          Oncology    Metastatic malignant neoplasm, unspecified site    Stable under treatment with her oncologist            GI    Nausea - Primary    Onset for a week that comes and goes.            Other    Fever and chills    One-week of symptoms   Clinically she is not toxic and has no in fact did findings   Laboratory and urine ordered for today.         Chronic fatigue    Fatigue is not changed in relation to her malignancy           Other Visit Diagnoses         Pyrexia of unknown origin following delivery                       Health Maintenance Due   Topic Date Due    Pneumococcal Vaccines (Age 50+) (3 of 3 - PCV) 08/31/2024    COVID-19 Vaccine (6 - 2024-25 season) 12/09/2024       Problem List Items Addressed This Visit          Oncology    Metastatic malignant neoplasm, unspecified site    Current Assessment & Plan   Stable under treatment with her oncologist            GI    Nausea - Primary    Current Assessment & Plan   Onset for a week that comes and goes.            Other    Fever and chills    Current Assessment & Plan   One-week of symptoms   Clinically she is not toxic and has no in fact did findings   Laboratory and urine ordered for today.         Chronic fatigue    Current Assessment & Plan   Fatigue is not changed in relation to her malignancy          Other Visit Diagnoses         Pyrexia of unknown origin following delivery                Health Maintenance Topics with due status: Not Due       Topic Last Completion Date    Lipid Panel 07/19/2024    DEXA Scan 09/19/2024    TETANUS VACCINE 11/29/2024       Future Appointments   Date Time Provider Department Center   9/22/2025  1:40 PM Raffy Beckham MD [place] [id number]        I spent a total of 37 minutes on the day of the visit.This includes face to face time and non-face to face time preparing to see the patient (eg, review of tests), obtaining and/or reviewing separately obtained history, documenting clinical information in the electronic or other health record, independently interpreting results and communicating results to the patient/family/caregiver, or care coordinator.      Signature:  Raffy Beckham MD  [facility]  [address] [place] [id number]    Date of encounter: 2/20/25

## 2025-02-20 NOTE — ASSESSMENT & PLAN NOTE
One-week of symptoms   Clinically she is not toxic and has no in fact did findings   Laboratory and urine ordered for today.

## 2025-02-21 ENCOUNTER — RESULTS FOLLOW-UP (OUTPATIENT)
Dept: INTERNAL MEDICINE | Facility: CLINIC | Age: 80
End: 2025-02-21
Payer: MEDICARE

## 2025-02-21 ENCOUNTER — LAB VISIT (OUTPATIENT)
Dept: LAB | Facility: HOSPITAL | Age: 80
End: 2025-02-21
Attending: INTERNAL MEDICINE
Payer: MEDICARE

## 2025-02-21 DIAGNOSIS — R11.0 NAUSEA: ICD-10-CM

## 2025-02-21 DIAGNOSIS — R50.9 FEVER AND CHILLS: ICD-10-CM

## 2025-02-21 DIAGNOSIS — E78.5 HYPERLIPIDEMIA, UNSPECIFIED HYPERLIPIDEMIA TYPE: ICD-10-CM

## 2025-02-21 DIAGNOSIS — E78.5 HYPERLIPIDEMIA, UNSPECIFIED HYPERLIPIDEMIA TYPE: Primary | Chronic | ICD-10-CM

## 2025-02-21 LAB
BACTERIA #/AREA URNS AUTO: ABNORMAL /HPF
BILIRUB UR QL STRIP.AUTO: NEGATIVE
CLARITY UR: CLEAR
COLOR UR AUTO: COLORLESS
GLUCOSE UR QL STRIP: NORMAL
HGB UR QL STRIP: ABNORMAL
KETONES UR QL STRIP: NEGATIVE
LEUKOCYTE ESTERASE UR QL STRIP: 500
NITRITE UR QL STRIP: NEGATIVE
PH UR STRIP: 6.5 [PH]
PROT UR QL STRIP: NEGATIVE
RBC #/AREA URNS AUTO: ABNORMAL /HPF
SP GR UR STRIP.AUTO: 1 (ref 1–1.03)
SQUAMOUS #/AREA URNS LPF: ABNORMAL /HPF
UROBILINOGEN UR STRIP-ACNC: NORMAL
WBC #/AREA URNS AUTO: ABNORMAL /HPF

## 2025-02-21 PROCEDURE — 87077 CULTURE AEROBIC IDENTIFY: CPT

## 2025-02-21 PROCEDURE — 81001 URINALYSIS AUTO W/SCOPE: CPT

## 2025-02-21 NOTE — TELEPHONE ENCOUNTER
Laboratory results   Urinalysis abnormal with many white cells   Please check and see if a culture will be done   Her red blood cell count has decreased issue is more of an anemia developing.    Please send all her lab to Dr. Piedra for him to review and have her repeat her CBC Monday and CMP Monday.

## 2025-02-21 NOTE — TELEPHONE ENCOUNTER
Spoke with pt regarding lab results, pt verbalized understanding of results. Spoke with pt regarding lab orders placed, informed pt of labs placed

## 2025-02-21 NOTE — TELEPHONE ENCOUNTER
----- Message from aRffy Beckham MD sent at 2/21/2025 10:33 AM CST -----      ----- Message -----  From: Lab, Background User  Sent: 2/20/2025   3:16 PM CST  To: Raffy Beckham MD

## 2025-02-23 LAB — BACTERIA UR CULT: ABNORMAL

## 2025-02-24 ENCOUNTER — RESULTS FOLLOW-UP (OUTPATIENT)
Dept: INTERNAL MEDICINE | Facility: CLINIC | Age: 80
End: 2025-02-24
Payer: MEDICARE

## 2025-02-24 ENCOUNTER — LAB VISIT (OUTPATIENT)
Dept: LAB | Facility: HOSPITAL | Age: 80
End: 2025-02-24
Attending: INTERNAL MEDICINE
Payer: MEDICARE

## 2025-02-24 DIAGNOSIS — R11.0 NAUSEA: ICD-10-CM

## 2025-02-24 DIAGNOSIS — E78.5 HYPERLIPIDEMIA, UNSPECIFIED HYPERLIPIDEMIA TYPE: ICD-10-CM

## 2025-02-24 DIAGNOSIS — N39.0 URINARY TRACT INFECTION WITHOUT HEMATURIA, SITE UNSPECIFIED: Primary | ICD-10-CM

## 2025-02-24 DIAGNOSIS — R50.9 FEVER AND CHILLS: ICD-10-CM

## 2025-02-24 LAB
ALBUMIN SERPL-MCNC: 3.1 G/DL (ref 3.4–4.8)
ALBUMIN/GLOB SERPL: 1 RATIO (ref 1.1–2)
ALP SERPL-CCNC: 74 UNIT/L (ref 40–150)
ALT SERPL-CCNC: 18 UNIT/L (ref 0–55)
ANION GAP SERPL CALC-SCNC: 6 MEQ/L
AST SERPL-CCNC: 17 UNIT/L (ref 5–34)
BASOPHILS # BLD AUTO: 0.07 X10(3)/MCL
BASOPHILS NFR BLD AUTO: 0.8 %
BILIRUB SERPL-MCNC: 0.4 MG/DL
BUN SERPL-MCNC: 12 MG/DL (ref 9.8–20.1)
CALCIUM SERPL-MCNC: 9.1 MG/DL (ref 8.4–10.2)
CHLORIDE SERPL-SCNC: 97 MMOL/L (ref 98–107)
CO2 SERPL-SCNC: 28 MMOL/L (ref 23–31)
CREAT SERPL-MCNC: 0.71 MG/DL (ref 0.55–1.02)
CREAT/UREA NIT SERPL: 17
EOSINOPHIL # BLD AUTO: 0.21 X10(3)/MCL (ref 0–0.9)
EOSINOPHIL NFR BLD AUTO: 2.3 %
ERYTHROCYTE [DISTWIDTH] IN BLOOD BY AUTOMATED COUNT: 14.7 % (ref 11.5–17)
GFR SERPLBLD CREATININE-BSD FMLA CKD-EPI: >60 ML/MIN/1.73/M2
GLOBULIN SER-MCNC: 3 GM/DL (ref 2.4–3.5)
GLUCOSE SERPL-MCNC: 141 MG/DL (ref 82–115)
HCT VFR BLD AUTO: 25.6 % (ref 37–47)
HGB BLD-MCNC: 8.2 G/DL (ref 12–16)
IMM GRANULOCYTES # BLD AUTO: 0.08 X10(3)/MCL (ref 0–0.04)
IMM GRANULOCYTES NFR BLD AUTO: 0.9 %
LYMPHOCYTES # BLD AUTO: 0.99 X10(3)/MCL (ref 0.6–4.6)
LYMPHOCYTES NFR BLD AUTO: 10.9 %
MCH RBC QN AUTO: 27.2 PG (ref 27–31)
MCHC RBC AUTO-ENTMCNC: 32 G/DL (ref 33–36)
MCV RBC AUTO: 84.8 FL (ref 80–94)
MONOCYTES # BLD AUTO: 0.69 X10(3)/MCL (ref 0.1–1.3)
MONOCYTES NFR BLD AUTO: 7.6 %
NEUTROPHILS # BLD AUTO: 7.03 X10(3)/MCL (ref 2.1–9.2)
NEUTROPHILS NFR BLD AUTO: 77.5 %
NRBC BLD AUTO-RTO: 0 %
PLATELET # BLD AUTO: 333 X10(3)/MCL (ref 130–400)
PMV BLD AUTO: 9.3 FL (ref 7.4–10.4)
POTASSIUM SERPL-SCNC: 4.5 MMOL/L (ref 3.5–5.1)
PROT SERPL-MCNC: 6.1 GM/DL (ref 5.8–7.6)
RBC # BLD AUTO: 3.02 X10(6)/MCL (ref 4.2–5.4)
SODIUM SERPL-SCNC: 131 MMOL/L (ref 136–145)
WBC # BLD AUTO: 9.07 X10(3)/MCL (ref 4.5–11.5)

## 2025-02-24 PROCEDURE — 80053 COMPREHEN METABOLIC PANEL: CPT

## 2025-02-24 PROCEDURE — 85025 COMPLETE CBC W/AUTO DIFF WBC: CPT

## 2025-02-24 PROCEDURE — 36415 COLL VENOUS BLD VENIPUNCTURE: CPT

## 2025-02-24 RX ORDER — SULFAMETHOXAZOLE AND TRIMETHOPRIM 800; 160 MG/1; MG/1
1 TABLET ORAL 2 TIMES DAILY
Qty: 14 TABLET | Refills: 1 | Status: CANCELLED | OUTPATIENT
Start: 2025-02-24

## 2025-02-24 RX ORDER — AMPICILLIN 500 MG/1
500 CAPSULE ORAL 2 TIMES DAILY
Qty: 10 CAPSULE | Refills: 0 | Status: SHIPPED | OUTPATIENT
Start: 2025-02-24

## 2025-02-24 NOTE — TELEPHONE ENCOUNTER
Hematocrit continues to drop slightly   Please send this to her oncologist Dr. Piedra and have her call him for follow-up.

## 2025-02-24 NOTE — TELEPHONE ENCOUNTER
----- Message from Raffy Beckham MD sent at 2/24/2025  3:34 PM CST -----      ----- Message -----  From: Raffy Beckham MD  Sent: 2/24/2025   3:07 PM CST  To: [facility] Staff    ----- Message from Raffy Beckham MD sent at 2/24/2025  3:07 PM CST -----      ----- Message -----  From: Raffy Beckham MD  Sent: 2/24/2025   7:53 AM CST  To: [facility] Staff    ----- Message from Raffy Beckham MD sent at 2/24/2025  7:53 AM CST -----      ----- Message -----  From: Lab, Background User  Sent: 2/21/2025   3:08 PM CST  To: Raffy Beckham MD

## 2025-02-24 NOTE — TELEPHONE ENCOUNTER
Urinalysis abnormal with UTI   If nontender treatment she can start Bactrim DS twice a day for 5 days if not allergic.

## 2025-02-24 NOTE — TELEPHONE ENCOUNTER
----- Message from Raffy Beckham MD sent at 2/24/2025  7:53 AM CST -----      ----- Message -----  From: Lab, Background User  Sent: 2/21/2025   3:08 PM CST  To: Raffy Beckham MD

## 2025-03-08 DIAGNOSIS — E55.9 VITAMIN D DEFICIENCY: ICD-10-CM

## 2025-03-10 RX ORDER — ERGOCALCIFEROL 1.25 MG/1
50000 CAPSULE ORAL
Qty: 12 CAPSULE | Refills: 1 | Status: SHIPPED | OUTPATIENT
Start: 2025-03-10

## 2025-05-30 ENCOUNTER — TELEPHONE (OUTPATIENT)
Dept: INTERNAL MEDICINE | Facility: CLINIC | Age: 80
End: 2025-05-30
Payer: MEDICARE

## 2025-05-30 NOTE — TELEPHONE ENCOUNTER
Copied from CRM #[id number]. Topic: General Inquiry - Patient Advice  >> May 30, 2025  9:35 AM Yady wrote:  .Who Called: Johana MITCHELL    Patient is returning phone call    Who Left Message for Patient:  Does the patient know what this is regarding?:Pls call pt to schedule hospital [facility]       Preferred Method of Contact: Phone Call  Patient's Preferred Phone Number on File: [phone]   Best Call Back Number, if different:  Additional Information:

## 2025-06-04 ENCOUNTER — OFFICE VISIT (OUTPATIENT)
Dept: INTERNAL MEDICINE | Facility: CLINIC | Age: 80
End: 2025-06-04
Payer: MEDICARE

## 2025-06-04 VITALS
OXYGEN SATURATION: 98 % | HEIGHT: 60 IN | HEART RATE: 73 BPM | DIASTOLIC BLOOD PRESSURE: 82 MMHG | BODY MASS INDEX: 39.85 KG/M2 | SYSTOLIC BLOOD PRESSURE: 130 MMHG | RESPIRATION RATE: 16 BRPM | WEIGHT: 203 LBS

## 2025-06-04 DIAGNOSIS — D63.0 ANEMIA IN NEOPLASTIC DISEASE: Primary | ICD-10-CM

## 2025-06-04 DIAGNOSIS — C79.9 METASTATIC MALIGNANT NEOPLASM, UNSPECIFIED SITE: ICD-10-CM

## 2025-06-04 DIAGNOSIS — I10 PRIMARY HYPERTENSION: Chronic | ICD-10-CM

## 2025-06-04 RX ORDER — NITROFURANTOIN 25; 75 MG/1; MG/1
100 CAPSULE ORAL DAILY
COMMUNITY
Start: 2025-05-07

## 2025-06-04 RX ORDER — POLYETHYLENE GLYCOL 3350 17 G/17G
17 POWDER, FOR SOLUTION ORAL DAILY
COMMUNITY

## 2025-06-04 RX ORDER — LORATADINE 10 MG
10 TABLET,DISINTEGRATING ORAL EVERY MORNING
COMMUNITY

## 2025-06-30 DIAGNOSIS — F41.9 ANXIETY: ICD-10-CM

## 2025-06-30 RX ORDER — ALPRAZOLAM 0.25 MG/1
0.25 TABLET ORAL DAILY PRN
Qty: 30 TABLET | Refills: 3 | Status: SHIPPED | OUTPATIENT
Start: 2025-06-30

## 2025-08-24 DIAGNOSIS — E55.9 VITAMIN D DEFICIENCY: ICD-10-CM

## 2025-08-24 RX ORDER — ERGOCALCIFEROL 1.25 MG/1
50000 CAPSULE ORAL
Qty: 12 CAPSULE | Refills: 1 | Status: SHIPPED | OUTPATIENT
Start: 2025-08-24

## 2025-08-29 DIAGNOSIS — E78.5 HYPERLIPIDEMIA, UNSPECIFIED HYPERLIPIDEMIA TYPE: ICD-10-CM

## 2025-08-29 DIAGNOSIS — I10 PRIMARY HYPERTENSION: ICD-10-CM

## 2025-08-29 RX ORDER — LOSARTAN POTASSIUM 100 MG/1
100 TABLET ORAL
Qty: 90 TABLET | Refills: 3 | Status: SHIPPED | OUTPATIENT
Start: 2025-08-29

## 2025-08-29 RX ORDER — ATORVASTATIN CALCIUM 20 MG/1
20 TABLET, FILM COATED ORAL
Qty: 90 TABLET | Refills: 3 | Status: SHIPPED | OUTPATIENT
Start: 2025-08-29